# Patient Record
Sex: FEMALE | Race: WHITE | URBAN - METROPOLITAN AREA
[De-identification: names, ages, dates, MRNs, and addresses within clinical notes are randomized per-mention and may not be internally consistent; named-entity substitution may affect disease eponyms.]

---

## 2018-10-13 ENCOUNTER — OFFICE VISIT (OUTPATIENT)
Dept: URGENT CARE | Facility: URGENT CARE | Age: 48
End: 2018-10-13
Payer: COMMERCIAL

## 2018-10-13 VITALS
HEART RATE: 74 BPM | DIASTOLIC BLOOD PRESSURE: 92 MMHG | WEIGHT: 144 LBS | SYSTOLIC BLOOD PRESSURE: 157 MMHG | TEMPERATURE: 98.6 F | OXYGEN SATURATION: 99 %

## 2018-10-13 DIAGNOSIS — S60.454A FOREIGN BODY OF RIGHT RING FINGER: Primary | ICD-10-CM

## 2018-10-13 PROCEDURE — 99203 OFFICE O/P NEW LOW 30 MIN: CPT | Performed by: NURSE PRACTITIONER

## 2018-10-13 ASSESSMENT — ENCOUNTER SYMPTOMS
NEUROLOGICAL NEGATIVE: 1
EYES NEGATIVE: 1
GASTROINTESTINAL NEGATIVE: 1
PSYCHIATRIC NEGATIVE: 1
ALLERGIC/IMMUNOLOGIC NEGATIVE: 1
ENDOCRINE NEGATIVE: 1
CONSTITUTIONAL NEGATIVE: 1
CARDIOVASCULAR NEGATIVE: 1
MUSCULOSKELETAL NEGATIVE: 1
RESPIRATORY NEGATIVE: 1
HEMATOLOGIC/LYMPHATIC NEGATIVE: 1

## 2018-10-13 ASSESSMENT — PAIN SCALES - GENERAL: PAINLEVEL: NO PAIN (0)

## 2018-10-13 NOTE — NURSING NOTE
Chief Complaint   Patient presents with     Finger     C/o ring stuck on right ring finger.       Initial BP (!) 157/92  Pulse 74  Temp 98.6  F (37  C) (Oral)  Wt 144 lb (65.3 kg)  SpO2 99% There is no height or weight on file to calculate BMI..  BP completed using cuff size: haile Avitia CMA

## 2018-10-13 NOTE — MR AVS SNAPSHOT
"              After Visit Summary   10/13/2018    Margoth Ludwig    MRN: [id number]           Patient Information     Date Of Birth          1970        Visit Information        Provider Department      10/13/2018 12:35 PM Lin Leong APRN CNP [facility]        Today's Diagnoses     Foreign body of right ring finger    -  1       Follow-ups after your visit        Who to contact     If you have questions or need follow up information about today's clinic visit or your schedule please contact [facility] directly at [phone].  Normal or non-critical lab and imaging results will be communicated to you by [employer], letter or phone within 4 business days after the clinic has received the results. If you do not hear from us within 7 days, please contact the clinic through [employer] or phone. If you have a critical or abnormal lab result, we will notify you by phone as soon as possible.  Submit refill requests through Pet[employer] or call your pharmacy and they will forward the refill request to us. Please allow 3 business days for your refill to be completed.          Additional Information About Your Visit        MyChart Information     Petrabytes lets you send messages to your doctor, view your test results, renew your prescriptions, schedule appointments and more. To sign up, go to [email or web address] . Click on \"Log in\" on the left side of the screen, which will take you to the Welcome page. Then click on \"Sign up Now\" on the right side of the page.     You will be asked to enter the access code listed below, as well as some personal information. Please follow the directions to create your username and password.     Your access code is: [email or web address]  Expires: 2019  1:01 PM     Your access code will  in 90 days. If you need help or a new code, please call your [facility]3] or [phone].        Care EveryWhere ID     This is your Care EveryWhere ID. This could be " used by other organizations to access your [place] medical records  [id number]        Your Vitals Were     Pulse Temperature Pulse Oximetry             74 98.6  F (37  C) (Oral) 99%          Blood Pressure from Last 3 Encounters:   10/13/18 (!) 157/92   10/14/12 125/74    Weight from Last 3 Encounters:   10/13/18 144 lb (65.3 kg)              Today, you had the following     No orders found for display       Primary Care Provider Office Phone # Fax #    [facility] [phone] [address] [facility] [address]        Equal Access to Services     ALEXEY SALCIDO : Hadii srinivasan stiles hadasho Soserg, waaxda luqadaha, qaybta kaalmada adeegyada, mitchell gore . So [facility] [phone].    ATENCIÓN: Si habla español, tiene a maldonado disposición servicios gratuitos de asistencia lingüística. Llame al [phone].    We comply with applicable federal civil rights laws and [place] laws. We do not discriminate on the basis of race, color, national origin, age, disability, sex, sexual orientation, or gender identity.            Thank you!     Thank you for choosing [facility]  for your care. Our goal is always to provide you with excellent care. Hearing back from our patients is one way we can continue to improve our services. Please take a few minutes to complete the written survey that you may receive in the mail after your visit with us. Thank you!             Your Updated Medication List - Protect others around you: Learn how to safely use, store and throw away your medicines at www.disposemymeds.org.      Notice  As of 10/13/2018  1:01 PM    You have not been prescribed any medications.

## 2018-10-13 NOTE — PROGRESS NOTES
SUBJECTIVE:   Margoth Ludwig is a 48 year old female presenting with a chief complaint of   Chief Complaint   Patient presents with     Finger     C/o ring stuck on right ring finger.   Put on this morning and was too tight, got stuck is swollen  Has normal circulation  Some mild pain    She is a new patient of [place].      Review of Systems   Constitutional: Negative.    HENT: Negative.    Eyes: Negative.    Respiratory: Negative.    Cardiovascular: Negative.    Gastrointestinal: Negative.    Endocrine: Negative.    Genitourinary: Negative.    Musculoskeletal: Negative.    Skin: Negative.    Allergic/Immunologic: Negative.    Neurological: Negative.    Hematological: Negative.    Psychiatric/Behavioral: Negative.        History reviewed. No pertinent past medical history.  History reviewed. No pertinent family history.  No current outpatient prescriptions on file.     Social History   Substance Use Topics     Smoking status: Never Smoker     Smokeless tobacco: Never Used     Alcohol use Not on file       OBJECTIVE  BP (!) 157/92  Pulse 74  Temp 98.6  F (37  C) (Oral)  Wt 144 lb (65.3 kg)  SpO2 99%    Physical Exam   Constitutional: She is oriented to person, place, and time. She appears well-developed and well-nourished.   Cardiovascular: Normal rate and regular rhythm.    Pulmonary/Chest: Effort normal and breath sounds normal.   Neurological: She is alert and oriented to person, place, and time.   Skin: Skin is warm and dry.   Right hand ring finger, ring stuck, some edema and redness surrounding. Normal circulation   Nursing note and vitals reviewed.      ASSESSMENT:    (U18.000S) Foreign body of right ring finger  (primary encounter diagnosis)      PLAN:  Removed with ring cutter  Normal circulation, advised ice for home care      Followup:    If not improving or if condition worsens, follow up with your Primary Care Provider  KIRSTY Claire CNP  .chris

## 2025-06-29 ENCOUNTER — TELEPHONE (OUTPATIENT)
Dept: BEHAVIORAL HEALTH | Facility: CLINIC | Age: 55
End: 2025-06-29

## 2025-06-29 ENCOUNTER — HOSPITAL ENCOUNTER (INPATIENT)
Facility: CLINIC | Age: 55
End: 2025-06-29
Attending: PSYCHIATRY & NEUROLOGY | Admitting: PSYCHIATRY & NEUROLOGY
Payer: COMMERCIAL

## 2025-06-29 ENCOUNTER — HOSPITAL ENCOUNTER (EMERGENCY)
Facility: CLINIC | Age: 55
Discharge: ANOTHER HOSPITAL | End: 2025-06-29
Attending: EMERGENCY MEDICINE | Admitting: EMERGENCY MEDICINE
Payer: COMMERCIAL

## 2025-06-29 VITALS
WEIGHT: 135 LBS | BODY MASS INDEX: 23.05 KG/M2 | OXYGEN SATURATION: 97 % | RESPIRATION RATE: 18 BRPM | SYSTOLIC BLOOD PRESSURE: 152 MMHG | DIASTOLIC BLOOD PRESSURE: 78 MMHG | HEIGHT: 64 IN | TEMPERATURE: 98.7 F | HEART RATE: 77 BPM

## 2025-06-29 DIAGNOSIS — F30.10 MANIC BEHAVIOR (H): ICD-10-CM

## 2025-06-29 DIAGNOSIS — F20.9 SCHIZOPHRENIA, UNSPECIFIED TYPE (H): Primary | ICD-10-CM

## 2025-06-29 DIAGNOSIS — F41.9 ANXIETY: ICD-10-CM

## 2025-06-29 DIAGNOSIS — G47.09 OTHER INSOMNIA: ICD-10-CM

## 2025-06-29 PROBLEM — F29 PSYCHOSIS (H): Status: ACTIVE | Noted: 2025-06-29

## 2025-06-29 LAB
ALBUMIN UR-MCNC: NEGATIVE MG/DL
AMPHETAMINES UR QL SCN: NORMAL
ANION GAP SERPL CALCULATED.3IONS-SCNC: 12 MMOL/L (ref 7–15)
APPEARANCE UR: ABNORMAL
BARBITURATES UR QL SCN: NORMAL
BENZODIAZ UR QL SCN: NORMAL
BILIRUB UR QL STRIP: NEGATIVE
BUN SERPL-MCNC: 10.3 MG/DL (ref 6–20)
BZE UR QL SCN: NORMAL
CALCIUM SERPL-MCNC: 9.2 MG/DL (ref 8.8–10.4)
CANNABINOIDS UR QL SCN: NORMAL
CHLORIDE SERPL-SCNC: 104 MMOL/L (ref 98–107)
COLOR UR AUTO: ABNORMAL
CREAT SERPL-MCNC: 0.75 MG/DL (ref 0.51–0.95)
EGFRCR SERPLBLD CKD-EPI 2021: >90 ML/MIN/1.73M2
ERYTHROCYTE [DISTWIDTH] IN BLOOD BY AUTOMATED COUNT: 12.5 % (ref 10–15)
ETHANOL SERPL-MCNC: <0.01 G/DL
FENTANYL UR QL: NORMAL
FLUAV RNA SPEC QL NAA+PROBE: NEGATIVE
FLUBV RNA RESP QL NAA+PROBE: NEGATIVE
GLUCOSE SERPL-MCNC: 114 MG/DL (ref 70–99)
GLUCOSE UR STRIP-MCNC: NEGATIVE MG/DL
HCO3 SERPL-SCNC: 24 MMOL/L (ref 22–29)
HCT VFR BLD AUTO: 38.3 % (ref 35–47)
HGB BLD-MCNC: 12.7 G/DL (ref 11.7–15.7)
HGB UR QL STRIP: NEGATIVE
HOLD SPECIMEN: NORMAL
KETONES UR STRIP-MCNC: ABNORMAL MG/DL
LEUKOCYTE ESTERASE UR QL STRIP: NEGATIVE
MCH RBC QN AUTO: 28.4 PG (ref 26.5–33)
MCHC RBC AUTO-ENTMCNC: 33.2 G/DL (ref 31.5–36.5)
MCV RBC AUTO: 86 FL (ref 78–100)
MUCOUS THREADS #/AREA URNS LPF: PRESENT /LPF
NITRATE UR QL: NEGATIVE
OPIATES UR QL SCN: NORMAL
PCP QUAL URINE (ROCHE): NORMAL
PH UR STRIP: 6.5 [PH] (ref 5–7)
PLATELET # BLD AUTO: 296 10E3/UL (ref 150–450)
POTASSIUM SERPL-SCNC: 3.8 MMOL/L (ref 3.4–5.3)
RBC # BLD AUTO: 4.47 10E6/UL (ref 3.8–5.2)
RBC URINE: 1 /HPF
RSV RNA SPEC NAA+PROBE: NEGATIVE
SARS-COV-2 RNA RESP QL NAA+PROBE: NEGATIVE
SODIUM SERPL-SCNC: 140 MMOL/L (ref 135–145)
SP GR UR STRIP: 1.02 (ref 1–1.03)
SQUAMOUS EPITHELIAL: <1 /HPF
UROBILINOGEN UR STRIP-MCNC: NORMAL MG/DL
WBC # BLD AUTO: 6.1 10E3/UL (ref 4–11)
WBC URINE: 2 /HPF

## 2025-06-29 PROCEDURE — 99285 EMERGENCY DEPT VISIT HI MDM: CPT

## 2025-06-29 PROCEDURE — 93010 ELECTROCARDIOGRAM REPORT: CPT | Mod: XP | Performed by: INTERNAL MEDICINE

## 2025-06-29 PROCEDURE — 80307 DRUG TEST PRSMV CHEM ANLYZR: CPT | Performed by: EMERGENCY MEDICINE

## 2025-06-29 PROCEDURE — 250N000013 HC RX MED GY IP 250 OP 250 PS 637

## 2025-06-29 PROCEDURE — 85014 HEMATOCRIT: CPT | Performed by: EMERGENCY MEDICINE

## 2025-06-29 PROCEDURE — 82077 ASSAY SPEC XCP UR&BREATH IA: CPT | Performed by: EMERGENCY MEDICINE

## 2025-06-29 PROCEDURE — 124N000002 HC R&B MH UMMC

## 2025-06-29 PROCEDURE — 87637 SARSCOV2&INF A&B&RSV AMP PRB: CPT | Performed by: EMERGENCY MEDICINE

## 2025-06-29 PROCEDURE — 85018 HEMOGLOBIN: CPT | Performed by: EMERGENCY MEDICINE

## 2025-06-29 PROCEDURE — 93010 ELECTROCARDIOGRAM REPORT: CPT | Performed by: EMERGENCY MEDICINE

## 2025-06-29 PROCEDURE — 80048 BASIC METABOLIC PNL TOTAL CA: CPT | Performed by: EMERGENCY MEDICINE

## 2025-06-29 PROCEDURE — 93005 ELECTROCARDIOGRAM TRACING: CPT

## 2025-06-29 PROCEDURE — 36415 COLL VENOUS BLD VENIPUNCTURE: CPT | Performed by: EMERGENCY MEDICINE

## 2025-06-29 PROCEDURE — 81001 URINALYSIS AUTO W/SCOPE: CPT | Performed by: EMERGENCY MEDICINE

## 2025-06-29 PROCEDURE — 99284 EMERGENCY DEPT VISIT MOD MDM: CPT | Performed by: EMERGENCY MEDICINE

## 2025-06-29 PROCEDURE — 82374 ASSAY BLOOD CARBON DIOXIDE: CPT | Performed by: EMERGENCY MEDICINE

## 2025-06-29 RX ORDER — AZITHROMYCIN 250 MG/1
250 TABLET, FILM COATED ORAL DAILY
Status: COMPLETED | OUTPATIENT
Start: 2025-06-29 | End: 2025-07-01

## 2025-06-29 RX ORDER — AMOXICILLIN 250 MG
1 CAPSULE ORAL 2 TIMES DAILY PRN
Status: DISPENSED | OUTPATIENT
Start: 2025-06-29

## 2025-06-29 RX ORDER — ALBUTEROL SULFATE 90 UG/1
1-2 INHALANT RESPIRATORY (INHALATION) EVERY 4 HOURS PRN
Status: ON HOLD | COMMUNITY
Start: 2025-06-19 | End: 2025-06-29

## 2025-06-29 RX ORDER — OLANZAPINE 10 MG/2ML
10 INJECTION, POWDER, FOR SOLUTION INTRAMUSCULAR 3 TIMES DAILY PRN
Status: ACTIVE | OUTPATIENT
Start: 2025-06-29

## 2025-06-29 RX ORDER — OLANZAPINE 10 MG/1
10 TABLET, FILM COATED ORAL 3 TIMES DAILY PRN
Status: ACTIVE | OUTPATIENT
Start: 2025-06-29

## 2025-06-29 RX ORDER — MAGNESIUM HYDROXIDE/ALUMINUM HYDROXICE/SIMETHICONE 120; 1200; 1200 MG/30ML; MG/30ML; MG/30ML
30 SUSPENSION ORAL EVERY 4 HOURS PRN
Status: ACTIVE | OUTPATIENT
Start: 2025-06-29

## 2025-06-29 RX ORDER — TRAZODONE HYDROCHLORIDE 50 MG/1
50 TABLET ORAL
Status: DISCONTINUED | OUTPATIENT
Start: 2025-06-29 | End: 2025-07-03

## 2025-06-29 RX ORDER — HYDROXYZINE HYDROCHLORIDE 25 MG/1
25 TABLET, FILM COATED ORAL EVERY 4 HOURS PRN
Status: DISPENSED | OUTPATIENT
Start: 2025-06-29

## 2025-06-29 RX ORDER — AZITHROMYCIN 250 MG/1
TABLET, FILM COATED ORAL
Status: ON HOLD | COMMUNITY
Start: 2025-06-27 | End: 2025-07-02

## 2025-06-29 RX ORDER — BENZONATATE 100 MG/1
100-200 CAPSULE ORAL 3 TIMES DAILY PRN
Status: ON HOLD | COMMUNITY
End: 2025-06-29

## 2025-06-29 RX ORDER — ACETAMINOPHEN 325 MG/1
650 TABLET ORAL EVERY 4 HOURS PRN
Status: DISPENSED | OUTPATIENT
Start: 2025-06-29

## 2025-06-29 RX ADMIN — HYDROXYZINE HYDROCHLORIDE 25 MG: 25 TABLET, FILM COATED ORAL at 21:50

## 2025-06-29 RX ADMIN — TRAZODONE HYDROCHLORIDE 50 MG: 50 TABLET ORAL at 20:20

## 2025-06-29 RX ADMIN — Medication 3 MG: at 21:50

## 2025-06-29 RX ADMIN — AZITHROMYCIN DIHYDRATE 250 MG: 250 TABLET ORAL at 17:32

## 2025-06-29 ASSESSMENT — ACTIVITIES OF DAILY LIVING (ADL)
TOILETING_ISSUES: NO
DIFFICULTY_EATING/SWALLOWING: NO
ADLS_ACUITY_SCORE: 15
ADLS_ACUITY_SCORE: 41
ADLS_ACUITY_SCORE: 15
LAUNDRY: WITH SUPERVISION
ADLS_ACUITY_SCORE: 41
ADLS_ACUITY_SCORE: 15
FALL_HISTORY_WITHIN_LAST_SIX_MONTHS: NO
CONCENTRATING,_REMEMBERING_OR_MAKING_DECISIONS_DIFFICULTY: NO
ADLS_ACUITY_SCORE: 41
ADLS_ACUITY_SCORE: 41
ORAL_HYGIENE: INDEPENDENT
ADLS_ACUITY_SCORE: 15
DRESSING/BATHING_DIFFICULTY: NO
ADLS_ACUITY_SCORE: 15
ADLS_ACUITY_SCORE: 41
DOING_ERRANDS_INDEPENDENTLY_DIFFICULTY: NO
ADLS_ACUITY_SCORE: 41
ADLS_ACUITY_SCORE: 41
WALKING_OR_CLIMBING_STAIRS_DIFFICULTY: NO
DRESS: INDEPENDENT
ADLS_ACUITY_SCORE: 41
DIFFICULTY_COMMUNICATING: NO
HYGIENE/GROOMING: INDEPENDENT
HEARING_DIFFICULTY_OR_DEAF: NO
ADLS_ACUITY_SCORE: 15
CHANGE_IN_FUNCTIONAL_STATUS_SINCE_ONSET_OF_CURRENT_ILLNESS/INJURY: NO
WEAR_GLASSES_OR_BLIND: NO

## 2025-06-29 ASSESSMENT — COLUMBIA-SUICIDE SEVERITY RATING SCALE - C-SSRS
6. HAVE YOU EVER DONE ANYTHING, STARTED TO DO ANYTHING, OR PREPARED TO DO ANYTHING TO END YOUR LIFE?: NO
2. HAVE YOU ACTUALLY HAD ANY THOUGHTS OF KILLING YOURSELF IN THE PAST MONTH?: NO
1. IN THE PAST MONTH, HAVE YOU WISHED YOU WERE DEAD OR WISHED YOU COULD GO TO SLEEP AND NOT WAKE UP?: NO
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
## 2025-06-29 NOTE — ED TRIAGE NOTES
[place]    [place]    Wants [place] police    Voices through the computer    Breakaway from the [facility].      has phone.
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
## 2025-06-29 NOTE — PHARMACY-ADMISSION MEDICATION HISTORY
Admission Medication History Completed by Pharmacy    See [facility] Admission Navigator for allergy information, preferred outpatient pharmacy, prior to admission medications and immunization status.     Medication history sources:  Patient; Surescripts     Pertinent changes made to PTA medication list:  Added: N/A  Deleted:   - Albuterol inhaler (pt reports no longer taking)   Changed: N/A    Additional medication history information:   - Patient is unsure if she was 1 or 2 days left of her azithromycin therapy.   - Patient denies taking any additional Rx/OTC medications other than the ones listed below.    Prior to Admission medications    Medication Sig Last Dose Taking? Auth Provider Long Term End Date   azithromycin (ZITHROMAX) 250 MG tablet Take two tablets now, then one tablet daily for 4 days.  Indications: Infection 6/28/2025 Yes Reported, Patient  7/2/25          Date completed: 06/29/25    Medication history completed by:   Imelda Polo, PharmD  *[id number]
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
## 2025-06-29 NOTE — PLAN OF CARE
Goal Outcome Evaluation:            06/29/25 1519   Patient Belongings   Did you bring any home meds/supplements to the hospital?  No   Patient Belongings locker;remains with patient   Patient Belongings Remaining with Patient jewelry   Patient Belongings Put in Hospital Secure Location (Security or Locker, etc.) clothing;cell phone/electronics   Belongings Search Yes   Clothing Search Yes   Second Staff Karly HILL         In Locker: cellphone, bra, underwear, pants, shirt, shoes    Nothing sent to security.     Addendum 07/03/25: 2 underwear, 2 pants, 2 t-shirts, jacket      A               Admission:  I am responsible for any personal items that are not sent to the safe or pharmacy.  [facility] is not responsible for loss, theft or damage of any property in my possession.    Signature:  _________________________________ Date: _______  Time: _____                                              Staff Signature:  ____________________________ Date: ________  Time: _____      2nd Staff person, if patient is unable/unwilling to sign:    Signature: ________________________________ Date: ________  Time: _____     Discharge:  [facility] has returned all of my personal belongings:    Signature: _________________________________ Date: ________  Time: _____                                          Staff Signature:  ____________________________ Date: ________  Time: _____

## 2025-06-29 NOTE — ED NOTES
"Pt called out, desiring to leave to \" do a wellness check on my \" MD updated, will go see patient now.   " None

## 2025-06-29 NOTE — ED NOTES
IP MH Referral Acuity Rating Score (RARS)    LMHP complete at referral to IP MH, with DEC; and, daily while awaiting IP MH placement. Call score to PPS.  CRITERIA SCORING   New 71 HH and Involuntary for IP MH (not adolescent) 0/3   Boarding over 24 hours 0/1   Vulnerable adult at least 55+ with multiple co morbidities; or, Patient age 11 or under 0/1   Suicide ideation without relief of precipitating factors 0/1   Current plan for suicide 0/1   Current plan for homicide 0/1   Imminent risk or actual attempt to seriously harm another without relief of factors precipitating the attempt 1/1   Severe dysfunction in daily living (ex: complete neglect for self care, extreme disruption in vegetative function, extreme deterioration in social interactions) 1/1   Recent (last 2 weeks) or current physical aggression in the ED 0/1   Restraints or seclusion episode in ED 0/1   Verbal aggression, agitation, yelling, etc., while in the ED 0/1   Active psychosis with psychomotor agitation or catatonia 1/1   Need for constant or near constant redirection (from leaving, from others, etc).  1/1   Intrusive or disruptive behaviors 1/1   TOTAL 5

## 2025-06-30 LAB
CHOLEST SERPL-MCNC: 102 MG/DL
EST. AVERAGE GLUCOSE BLD GHB EST-MCNC: 117 MG/DL
FOLATE SERPL-MCNC: 6.6 NG/ML (ref 4.6–34.8)
HBA1C MFR BLD: 5.7 %
HDLC SERPL-MCNC: 40 MG/DL
LDLC SERPL CALC-MCNC: 40 MG/DL
NONHDLC SERPL-MCNC: 62 MG/DL
TRIGL SERPL-MCNC: 109 MG/DL
TSH SERPL DL<=0.005 MIU/L-ACNC: 3.46 UIU/ML (ref 0.3–4.2)
VIT B12 SERPL-MCNC: 435 PG/ML (ref 232–1245)
VIT D+METAB SERPL-MCNC: 32 NG/ML (ref 20–50)

## 2025-06-30 PROCEDURE — 97150 GROUP THERAPEUTIC PROCEDURES: CPT | Mod: GO

## 2025-06-30 PROCEDURE — 99232 SBSQ HOSP IP/OBS MODERATE 35: CPT | Mod: GC | Performed by: PSYCHIATRY & NEUROLOGY

## 2025-06-30 PROCEDURE — 82746 ASSAY OF FOLIC ACID SERUM: CPT

## 2025-06-30 PROCEDURE — 124N000002 HC R&B MH UMMC

## 2025-06-30 PROCEDURE — H2032 ACTIVITY THERAPY, PER 15 MIN: HCPCS

## 2025-06-30 PROCEDURE — 36415 COLL VENOUS BLD VENIPUNCTURE: CPT

## 2025-06-30 PROCEDURE — 84443 ASSAY THYROID STIM HORMONE: CPT

## 2025-06-30 PROCEDURE — 83036 HEMOGLOBIN GLYCOSYLATED A1C: CPT

## 2025-06-30 PROCEDURE — 82306 VITAMIN D 25 HYDROXY: CPT

## 2025-06-30 PROCEDURE — 250N000013 HC RX MED GY IP 250 OP 250 PS 637

## 2025-06-30 PROCEDURE — 80061 LIPID PANEL: CPT

## 2025-06-30 PROCEDURE — 82607 VITAMIN B-12: CPT

## 2025-06-30 RX ADMIN — AZITHROMYCIN DIHYDRATE 250 MG: 250 TABLET ORAL at 08:24

## 2025-06-30 RX ADMIN — ACETAMINOPHEN 650 MG: 325 TABLET ORAL at 08:54

## 2025-06-30 RX ADMIN — HYDROXYZINE HYDROCHLORIDE 25 MG: 25 TABLET, FILM COATED ORAL at 21:32

## 2025-06-30 ASSESSMENT — ACTIVITIES OF DAILY LIVING (ADL)
ADLS_ACUITY_SCORE: 20
ADLS_ACUITY_SCORE: 15
ADLS_ACUITY_SCORE: 20
ADLS_ACUITY_SCORE: 15
ADLS_ACUITY_SCORE: 20
LAUNDRY: WITH SUPERVISION
HYGIENE/GROOMING: INDEPENDENT
ADLS_ACUITY_SCORE: 20
ORAL_HYGIENE: INDEPENDENT
DRESS: INDEPENDENT
ADLS_ACUITY_SCORE: 20
ADLS_ACUITY_SCORE: 20
ADLS_ACUITY_SCORE: 15
ADLS_ACUITY_SCORE: 20
HYGIENE/GROOMING: INDEPENDENT
ADLS_ACUITY_SCORE: 20
ADLS_ACUITY_SCORE: 20
ADLS_ACUITY_SCORE: 15
ADLS_ACUITY_SCORE: 20
DRESS: INDEPENDENT
ADLS_ACUITY_SCORE: 15
ADLS_ACUITY_SCORE: 20
ORAL_HYGIENE: INDEPENDENT
ADLS_ACUITY_SCORE: 20

## 2025-06-30 NOTE — PLAN OF CARE
Problem: Adult Behavioral Health Plan of Care  Goal: Absence of New-Onset Illness or Injury  Outcome: Progressing     Problem: Anxiety Signs/Symptoms  Goal: Improved Sleep (Anxiety Signs/Symptoms)  Outcome: Progressing   Goal Outcome Evaluation:       Pt.appeared asleep and comfortable through the night. Slept for 6 hours. Breath sounds were clear and unlabored. No prn medication administered overnight. No safety or behavioral concerns observed or reported. Will continue to monitor.

## 2025-06-30 NOTE — PLAN OF CARE
Occupational Therapy   Brief Assessment   Station 22    Information is based on chart review, interdisciplinary team correspondence and writer's observations and interactions with the patient in groups and on the unit.      Data     06/30/25 1500   General Information   Date Initially Attended OT 06/30/25   Clinical Impression   Affect Restricted;Appropriate to situation   Orientation Oriented to person, place and time   Appearance and ADLs General cleanliness observed in most areas   Attention to Internal Stimuli No observed signs;Needs further assessment  (reports AH but not observed responding in groups thus far)   Interaction Skills Interacts appropriately with staff;Interacts appropriately with peers   Ability to Communicate Needs Independent   Verbal Content Articulate;Clear;Appropriate to topic   Ability to Maintain Boundaries Maintains appropriate physical boundaries;Maintains appropriate verbal boundaries   Participation Independently participates   Concentration Concentrates 30+ minutes   Ability to Concentrate Without difficulty   Follows and Comprehends Directions Independently follows multi-step directions   Memory Delayed and immediate recall intact   Organization Independently organizes all tasks   Decision Making Independent   Planning and Problem Solving Occasionally needs assist/feedback   Ability to Apply and Learn Concepts Needs further assessment   Frustrations / Stress Tolerance Needs further assessment   Level of Insight Identifies needs with structure/support   Self Esteem Takes risks with support and encouragement;Accepts positive feedback   Social Supports Has knowledge of support systems   BEH OT Care Plan Goals   OT Care Plan coping with symptoms       Assessment    Anticipate patient will benefit from a safe, structured environment focusing on recovery strategies while stabilizing on medication.  Patient appears to have limited knowledge of and follow through with coping skills and  self-management strategies, thus contributing to exacerbation of mental health symptoms.  Patient's insight into their mental illness is fair at this time.  Patient would benefit from OT groups for exploration of coping skills and leisure interests for symptom and stress management, exploration of and practice using relapse prevention strategies, and opportunities for: Self-care skills, social interaction skills, self-management skills and ADL/work/leisure/processing skills training.      Plan    Care plan goals have been initiated (see flowsheet above).  Plan to offer graded occupation-based activities to support progress towards goals and independence with ADLs/IADLs in the community upon discharge.  Continue to correspond with interdisciplinary team regarding ongoing treatment plan, potential changes in patient's status, and discharge planning.     Pat Galan, OTR, OTR/L  6/30/2025

## 2025-06-30 NOTE — PLAN OF CARE
"  Rehab Group    Start time: 1320  End time: 1405  Patient time total: 45 minutes    attended full group    #5 attended   Group Type: occupational therapy   Group Topic Covered: balanced lifestyle, Physical activity, and social skills     Group Session Detail:  Dice game - discussion questions & gentle movement     Patient Response/Contribution:  cooperative with task, listened actively, attentive, and actively engaged     Patient Detail:    Pt actively participated in a structured occupational therapy group with a focus on social engagement and movement. Pt had the option to choose a self-reflection question or a gentle exercise/stretching movement, and pt primarily chose to answer questions. Pt appeared comfortable responding to discussion prompts, and also followed along with all guided movements. When prompted to identify a new hobby she would be interested in trying, she identified \"yoga.\" When prompted to identify a goal she has for the future, she discussed wanting to become more \"tech savvy.\" Calm, pleasant, and engaged.      [id number] OT Group (2 or more in attendance)      Patient Active Problem List   Diagnosis    Psychosis (H)       "
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
## 2025-06-30 NOTE — PLAN OF CARE
06/30/25 1123   Individualization/Patient Specific Goals   Patient Personal Strengths family/social support;independent living skills   Patient Vulnerabilities family/relationship conflict;lacks insight into illness   Interprofessional Rounds   Participants CTC;nursing;psychiatrist   Behavioral Team Discussion   Progress New admit   Anticipated length of stay 10-15 days   Continued Stay Criteria/Rationale Ongoing symptoms, medication management   Medical/Physical See H&P   Precautions See below   Plan Psychiatric assessment/Medication management. Therapeutic Milieu. Individual care planning and after care planning. Patient to participate in unit groups and activities. Individual and group support on unit.   Rationale for change in precautions or plan N/A   Safety Plan Per unit protocol         PRECAUTIONS AND SAFETY    Behavioral Orders   Procedures    Code 1 - Restrict to Unit    Routine Programming     As clinically indicated    Status 15     Every 15 minutes.       Safety  Safety WDL: WDL  Patient Location: lounge  Observed Behavior: calm, sitting  Observed Behavior (Comment): calm  Safety Measures: safety rounds completed  Diversional Activity: music, television  Suicidality: Status 15

## 2025-06-30 NOTE — PLAN OF CARE
Rehab Group    Start time: 1030  End time: 1210  Patient time total: 100 minutes    attended full group    #5 attended   Group Type: occupational therapy   Group Topic Covered: coping skills     Group Session Detail:  OT clinic     Patient Response/Contribution:  cooperative with task, attentive, and actively engaged     Patient Detail:    Pt actively participated in occupational therapy clinic to facilitate coping skill exploration, creative expression within personally meaningful activities, and clinical observation of social, cognitive, and kinesthetic performance skills. Pt response: Upon arrival to group, pt was oriented to project options and group materials. Task materials were provided, and subsequently independent to initiate, sequence, and adjust to workspace demands as needed. Demonstrated fair focus, planning, and attention to detail for selected creative expression task. Upon completing her project, she was receptive to learning task steps for a novel creative expression task. Able to ask for assistance as needed, and occasionally socialized with peers and staff. Affect appeared to brighten briefly in interactions.       [id number] OT Group (2 or more in attendance)      Patient Active Problem List   Diagnosis    Psychosis (H)
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
## 2025-06-30 NOTE — PLAN OF CARE
BEH IP Unit Acuity Rating Score (UARS)  Patient is given one point for every criteria they meet.    CRITERIA SCORING   On a 72 hour hold, court hold, committed, stay of commitment, or revocation. 0    Patient LOS on BEH unit exceeds 20 days. 0  LOS: 1   Patient under guardianship, 55+, otherwise medically complex, or under age 11. 1   Suicide ideation without relief of precipitating factors. 0   Current plan for suicide. 0   Current plan for homicide. 0   Imminent risk or actual attempt to seriously harm another without relief of factors precipitating the attempt. 1   Severe dysfunction in daily living (ex: complete neglect for self care, extreme disruption in vegetative function, extreme deterioration in social interactions). 1   Recent (last 7 days) or current physical aggression in the ED or on unit. 0   Restraints or seclusion episode in past 72 hours. 0   Recent (last 7 days) or current verbal aggression, agitation, yelling, etc., while in the ED or unit. 0   Active psychosis. 1   Need for constant or near constant redirection (from leaving, from others, etc).  1   Intrusive or disruptive behaviors. 1   Patient requires 3 or more hours of individualized nursing care per 8-hour shift (i.e. for ADLs, meds, therapeutic interventions). 0   TOTAL 5

## 2025-07-01 LAB
ATRIAL RATE - MUSE: 90 BPM
DIASTOLIC BLOOD PRESSURE - MUSE: NORMAL MMHG
INTERPRETATION ECG - MUSE: NORMAL
P AXIS - MUSE: 69 DEGREES
PR INTERVAL - MUSE: 144 MS
QRS DURATION - MUSE: 88 MS
QT - MUSE: 354 MS
QTC - MUSE: 433 MS
R AXIS - MUSE: -39 DEGREES
SYSTOLIC BLOOD PRESSURE - MUSE: NORMAL MMHG
T AXIS - MUSE: 53 DEGREES
VENTRICULAR RATE- MUSE: 90 BPM

## 2025-07-01 PROCEDURE — 97150 GROUP THERAPEUTIC PROCEDURES: CPT | Mod: GO

## 2025-07-01 PROCEDURE — 124N000002 HC R&B MH UMMC

## 2025-07-01 PROCEDURE — 99232 SBSQ HOSP IP/OBS MODERATE 35: CPT | Mod: GC | Performed by: PSYCHIATRY & NEUROLOGY

## 2025-07-01 PROCEDURE — 250N000013 HC RX MED GY IP 250 OP 250 PS 637

## 2025-07-01 RX ORDER — ARIPIPRAZOLE 5 MG/1
5 TABLET ORAL DAILY
Status: DISCONTINUED | OUTPATIENT
Start: 2025-07-01 | End: 2025-07-03

## 2025-07-01 RX ADMIN — ARIPIPRAZOLE 5 MG: 5 TABLET ORAL at 11:17

## 2025-07-01 RX ADMIN — AZITHROMYCIN DIHYDRATE 250 MG: 250 TABLET ORAL at 08:14

## 2025-07-01 RX ADMIN — Medication 3 MG: at 23:45

## 2025-07-01 ASSESSMENT — ACTIVITIES OF DAILY LIVING (ADL)
LAUNDRY: WITH SUPERVISION
ADLS_ACUITY_SCORE: 20
LAUNDRY: WITH SUPERVISION
ADLS_ACUITY_SCORE: 20
DRESS: INDEPENDENT
ADLS_ACUITY_SCORE: 20
ADLS_ACUITY_SCORE: 20
HYGIENE/GROOMING: INDEPENDENT
ADLS_ACUITY_SCORE: 20
DRESS: INDEPENDENT
ADLS_ACUITY_SCORE: 20
ORAL_HYGIENE: INDEPENDENT
ADLS_ACUITY_SCORE: 20
ORAL_HYGIENE: INDEPENDENT
ADLS_ACUITY_SCORE: 20
HYGIENE/GROOMING: INDEPENDENT
ADLS_ACUITY_SCORE: 20

## 2025-07-01 NOTE — PLAN OF CARE
Problem: Sleep Disturbance  Goal: Adequate Sleep/Rest  Outcome: Progressing  Intervention: Promote Sleep/Rest  Flowsheets (Taken 7/1/2025 0037)  Sleep/Rest Enhancement:   awakenings minimized   room darkened   noise level reduced     Goal Outcome Evaluation:    Patient in bed at the start of the shift and  appeared to be comfortably asleep and breathing with ease throughout the shift. Patient slept for 6hours of the over night shift with no s/s of acute distress.    Patient experienced no safety events or escalations during the shift, no concerns reported or observed. Safety checks completed at least every 15 minutes. Patient required no PRN medications, see MAR.    Nursing will continue to monitor.

## 2025-07-01 NOTE — PLAN OF CARE
BEH IP Unit Acuity Rating Score (UARS)  Patient is given one point for every criteria they meet.    CRITERIA SCORING   On a 72 hour hold, court hold, committed, stay of commitment, or revocation. 0    Patient LOS on BEH unit exceeds 20 days. 0  LOS: 2   Patient under guardianship, 55+, otherwise medically complex, or under age 11. 1   Suicide ideation without relief of precipitating factors. 0   Current plan for suicide. 0   Current plan for homicide. 0   Imminent risk or actual attempt to seriously harm another without relief of factors precipitating the attempt. 1   Severe dysfunction in daily living (ex: complete neglect for self care, extreme disruption in vegetative function, extreme deterioration in social interactions). 1   Recent (last 7 days) or current physical aggression in the ED or on unit. 0   Restraints or seclusion episode in past 72 hours. 0   Recent (last 7 days) or current verbal aggression, agitation, yelling, etc., while in the ED or unit. 0   Active psychosis. 1   Need for constant or near constant redirection (from leaving, from others, etc).  1   Intrusive or disruptive behaviors. 1   Patient requires 3 or more hours of individualized nursing care per 8-hour shift (i.e. for ADLs, meds, therapeutic interventions). 0   TOTAL 5

## 2025-07-01 NOTE — PLAN OF CARE
Goal Outcome Evaluation:    Plan of Care Reviewed With: patient       Patient spent time sitting in room or sitting in lounge, minimal interaction with peers.  Pt took shower and was polite, but appeared hesitant when asked what items she would need.  Pt also indecisive when asked if she might need any prns, reporting that she took melatonin and Trazodone last night to minimal effect.  Pt also reported being anxious, settling on Vistaril.

## 2025-07-01 NOTE — PLAN OF CARE
Rehab Group    Start time: 1025  End time: 1200  Patient time total: 85 minutes    attended full group    #4 attended   Group Type: occupational therapy   Group Topic Covered: coping skills     Group Session Detail:  OT clinic     Patient Response/Contribution:  cooperative with task, attentive, and actively engaged     Patient Detail:    Pt actively participated in occupational therapy clinic to facilitate coping skill exploration, creative expression within personally meaningful activities, and clinical observation of social, cognitive, and kinesthetic performance skills. Pt response: Independent to initiate, gather materials, sequence, and adjust to workspace demands as needed. Demonstrated good focus, planning, and attention to detail for selected creative expression task. Able to ask for assistance as needed, and appeared comfortable interacting with peers and staff when conversation was initiated with her, though spent a majority of this group quietly focused on her task. Calm and cooperative. Affect appeared restricted.      [id number] OT Group (2 or more in attendance)      Patient Active Problem List   Diagnosis    Psychosis (H)

## 2025-07-01 NOTE — PLAN OF CARE
"Problem: Adult Behavioral Health Plan of Care  Goal: Plan of Care Review  Outcome: Progressing  Flowsheets (Taken 7/1/2025 1106)  Plan of Care Reviewed With: patient  Overall Patient Progress: improving  Patient Agreement with Plan of Care: agrees  Goal: Optimized Coping Skills in Response to Life Stressors  Outcome: Progressing  Flowsheets (Taken 7/1/2025 1106)  Optimized Coping Skills in Response to Life Stressors: making progress toward outcome     Problem: Psychotic Signs/Symptoms  Goal: Improved Mood Symptoms (Psychotic Signs/Symptoms)  Outcome: Progressing  Flowsheets (Taken 7/1/2025 1106)  Mutually Determined Action Steps (Improved Mood Symptoms): acknowledges progress  Goal: Decreased Sensory Symptoms (Psychotic Signs/Symptoms)  Outcome: Progressing  Flowsheets (Taken 7/1/2025 1106)  Mutually Determined Action Steps (Decreased Sensory Symptoms): adheres to medication regimen   Goal Outcome Evaluation:      Plan of Care Reviewed With: patient    Overall Patient Progress: improvingOverall Patient Progress: improving     Margoth COVARRUBIAS sitting in lounge at beginning of shift-eyes downcast- appeared anxious-returned to room when bk arrived- on approach stated she is feeling very sad because she is all alone in the world-complained family members were not answering their phones this morning and that she felt all alone-complained sister said she would visit yesterday, but didn't, then mentioned that it might be related to Margoth writing on Facebook that she never wanted to see her again because she's too nosey-went on to say although she wrote that she didn't really mean she never wanted to talk to her, \"I mean she is fito nosey, but she's my sister. Of course I want to see my sister.\"- accepted newly prescribed Abilify, initially hesitant, examining labeling on package and making statements that it is not same medication discussed with MD, but post brief review and reassurance, shrugged shoulders and took medication- " 1430 Margoth attended grps today- denies any negative side effects from Abilify-appears more relaxed

## 2025-07-01 NOTE — PLAN OF CARE
"  Rehab Group    Start time: 1320  End time: 1405  Patient time total: 45 minutes    attended full group     #3 attended   Group Type: occupational therapy   Group Topic Covered: balanced lifestyle, coping skills, and relaxation      Group Session Detail:  Chair yoga     Patient Response/Contribution:  cooperative with task, listened actively, attentive, and actively engaged     Patient Detail:    Pt actively participated in a structured occupational therapy group with a focus on coping through movement to facilitate relaxation and stress management via chair yoga. Reported that yoga was new to her. Pt followed and engaged in approximately 90% of the yoga poses, and verbalized feeling \"good\" at the end of group. Appeared calm and attentive throughout.      [id number] OT Group (2 or more in attendance)      Patient Active Problem List   Diagnosis    Psychosis (H)       "

## 2025-07-01 NOTE — PROGRESS NOTES
Rehab Group    Start time: 1715  End time: 1815  Patient time total: 45 minutes    attended full group    #4 attended   Group Type: art   Group Topic Covered: activity therapy       Group Session Detail:  Art Therapy directive was to create imagery expressing both positive things in pts life and things they would like to change/let go using hand tracings and imagery and writing.  Goals of directive: emotional expression, emotional regulation, assessing pts motivation for change, media exploration     Patient Response/Contribution:  cooperative with task       Patient Detail:    Pt was an engaged participant, focused on task for the full duration of group. Pt finished artwork and briefly verbally processed with author and group. Pts mood was calm, flat/blunted affect.      Activity Therapy Per 15 min ()      Patient Active Problem List   Diagnosis    Psychosis (H)

## 2025-07-01 NOTE — PROGRESS NOTES
"  ----------------------------------------------------------------------------------------------------------  [facility]  Psychiatry Progress Note  Hospital Day #2     Interim History:     The patient's care was discussed with the treatment team and chart notes were reviewed.    Vitals: VSS  Sleep: 6 hours (07/01/25 0629)  Scheduled medications: Took all scheduled medications as prescribed  Psychiatric PRN medications:   Last 24H PRN:     hydrOXYzine HCl (ATARAX) tablet 25 mg, 25 mg at 06/30/25 4902   Staff Report:   No acute events or safety concerns overnight. Please see staff notes for details.      Subjective:     Patient Interview:  Margoth Ludwig reports doing \"okay\". She slept \"okay\", better than previously but not good. She was unsure about what medications she's taking and had questions about this; she was wondering about possible side effects. Her  helped her with her medications previously. Discussed starting Abilify; she agreed with this plan after discussing benefits and risks. Groups are going well, and she feels comfortable. She talked to her  yesterday which went well; she is feeling safer with him. She is hoping to leave by the 3rd.      does not have a therapist and is interested in this. She lives in [place] and goes to [facility] in [place] for other medical appointments.     ROS:  Patient has no bothersome physical symptoms  Patient denies acute concerns     Objective:     Vitals:  BP (!) 160/91 (BP Location: Right arm, Patient Position: Sitting, Cuff Size: Adult Regular)   Pulse 80   Temp 98.4  F (36.9  C) (Temporal)   Resp 16   Ht 1.626 m (5' 4\")   Wt 60.6 kg (133 lb 11.2 oz)   SpO2 97%   BMI 22.95 kg/m      Allergies:  Allergies   Allergen Reactions    Clindamycin Hives    Erythromycin Hives    Penicillins Hives    Amoxicillin Hives       Current Medications:  Scheduled:  Current Facility-Administered Medications "   Medication Dose Route Frequency Provider Last Rate Last Admin    acetaminophen (TYLENOL) tablet 650 mg  650 mg Oral Q4H PRN Juan Thayer MD   650 mg at 06/30/25 0854    alum & mag hydroxide-simethicone (MAALOX) suspension 30 mL  30 mL Oral Q4H PRN Juan Thayer MD        ARIPiprazole (ABILIFY) tablet 5 mg  5 mg Oral Daily Juan Thayer MD   5 mg at 07/01/25 1117    hydrOXYzine HCl (ATARAX) tablet 25 mg  25 mg Oral Q4H PRN Juan Thayer MD   25 mg at 06/30/25 2132    melatonin tablet 3 mg  3 mg Oral At Bedtime PRN Juan Thayer MD   3 mg at 06/29/25 2150    OLANZapine (zyPREXA) tablet 10 mg  10 mg Oral TID PRN Juan Thayer MD        Or    OLANZapine (zyPREXA) injection 10 mg  10 mg Intramuscular TID PRN Juan Thayer MD        senna-docusate (SENOKOT-S/PERICOLACE) 8.6-50 MG per tablet 1 tablet  1 tablet Oral BID PRN Juan Thayer MD        traZODone (DESYREL) tablet 50 mg  50 mg Oral At Bedtime PRN Juan Thayer MD   50 mg at 06/29/25 2020       PRN:  Current Facility-Administered Medications   Medication Dose Route Frequency Provider Last Rate Last Admin    acetaminophen (TYLENOL) tablet 650 mg  650 mg Oral Q4H PRN Juan Thayer MD   650 mg at 06/30/25 0854    alum & mag hydroxide-simethicone (MAALOX) suspension 30 mL  30 mL Oral Q4H PRN Juan Thayer MD        ARIPiprazole (ABILIFY) tablet 5 mg  5 mg Oral Daily Juan Thayer MD   5 mg at 07/01/25 1117    hydrOXYzine HCl (ATARAX) tablet 25 mg  25 mg Oral Q4H PRN Juan Thayer MD   25 mg at 06/30/25 2132    melatonin tablet 3 mg  3 mg Oral At Bedtime PRN Juan Thayer MD   3 mg at 06/29/25 2150    OLANZapine (zyPREXA) tablet 10 mg  10 mg Oral TID PRN Juan Thayer MD        Or    OLANZapine (zyPREXA) injection 10 mg  10 mg Intramuscular TID PRN Juan Thayer MD        senna-docusate (SENOKOT-S/PERICOLACE) 8.6-50 MG per tablet 1 tablet  1 tablet Oral BID PRN Juan Thayer MD        traZODone (DESYREL) tablet 50 mg  50 mg Oral At Bedtime PRN Jeovany,  "MD Juan   50 mg at 06/29/25 2020       Labs and Imaging:  New results:   No results found for this or any previous visit (from the past 24 hours).      Data this admission:  - CBC unremarkable  - CMP unremarkable  - TSH normal  - UDS negative  - Vit D pending  - Hgb A1c at 5.7%  - Lipids : HDL chol - 40;   - Vit B12 and folate normal  - Urinalysis unremarkable  - EKG Qtc - 433 ; Sinus rhythm    Left axis deviation  Nonspecific T wave abnormality  Abnormal ECG  When compared with ECG of 29-Jun-2025 10:02, (unconfirmed)  QRS axis Shifted left  Nonspecific T wave abnormality now evident in Lateral leads     Mental Status Exam:     Oriented to:  Grossly Oriented  General:  Awake and Alert  Appearance:  appears stated age and Grooming is adequate  Behavior/Attitude:  Cooperative and Open  Eye Contact: Appropriate  Psychomotor: Normal No evidence of tics.  Speech:  appropriate volume/tone  Language: Fluent in English with appropriate syntax and vocabulary.  Mood:  \"okay\"  Affect:  stable  Thought Process:  coherent  Thought Content:   No SI/HI/AH/VH; delusions of paranoia  Associations:  intact  Insight:  good  Judgment:  good due to   Impulse control: fair  Attention Span:  grossly intact  Concentration:  grossly intact  Recent and Remote Memory:  not formally assessed  Fund of Knowledge: average  Muscle Strength and Tone: normal  Gait and Station: Normal     Psychiatric Assessment   Margoth Ludwig is a 55 year old female with no past psychiatric history who presented voluntarily with increased anxiety and possible delusions/psychosis.      There is on past psychiatric diagnoses or hospitalization. Significant symptoms on admission include paranoia and delusions of people hacking her work and personal devices, increased anxiety, and decreased sleep. The MSE on admission was pertinent for anxious affect.     Biologically, patient does not appear to have any medical co-morbidities or history that's seem to " "contribution to current presentation. Of note, patient is post-menopausal so recent hormonal changes may be contributing. Psychologically, patient has good insight into her symptoms and is coherent in explaining her worries. She reports hobbies and coping skills she utilizes. Socially, she has a stable job, living, and trusting . Protective factor includes engaged in treatment, open to receiving help, and strong family and theresa.         In summary, the patient's reported symptoms of sleep disturbances, paranoia, and anxiety is not consist with a diagnosis at this time and a definitive diagnosis is still in evolution. Unspecified anxiety disorder with possible unspecific psychotic disorder - would need to collect collateral to verify if the paranoia/delusions of people hacking is true or not. At this time, patient does not want writer to call family or anyone for collateral. No jennifer history appreciated by patient report today. No substances and UDS negative.      Given that she currently has high anxiety and paranoia/delusions, patient warrants inpatient psychiatric hospitalization to maintain her safety.      Psychiatric Plan by Diagnosis      Today's changes:  - Started aripiprazole 5 mg   - \"Navigate program\"     #anxiety, unspecified   # psychosis, unspecified  1. Medications:  - started aripiprazole 5 mg morning  - olanzapine 10 mg TID PRN for agitation   - hydroxyzine PRN for anxiety   - melatonin PRN for sleep      2. Pertinent Labs/Monitoring:   - EKG for qtc (433) monitor  - consider first episode psychosis work up      3. Additional Plans:  - Patient will be treated in therapeutic milieu with appropriate individual and group therapies as described.      Psychiatric Hospital Course:    Margoth Ludwig was admitted to [facility] as a voluntary patient         6/30: Patient took only PRN trazadone, melatonin, hydroxyzine and was stable. But she has delusional thoughts. Refused to take neuroleptic " (aripiprazole)      7/1: Patient agree to start with ABILIFY 5 mg, and it is started today. Patient is stable.      The risks, benefits, alternatives, and side effects were discussed and understood by the patient and other caregivers.     Medical Assessment and Plan     Medical diagnoses to be addressed this admission:       # recent atypical pneumonia diagnosis   - azithromycin 250 mg for 5 day course starting 6/27. Patient has been taking consistently but not on day of admission. Has some cough remaining but otherwise no SOB/Chest pain.  - resume PTA azithromycin 250 mg for remaining 3 doses          Medical course: Patient was physically examined by the ED prior to being transferred to the unit and was found to be medically stable and appropriate for admission.      Consults:  none     Checklist     Legal Status: Voluntary     Safety Assessment:   Behavioral Orders   Procedures    Code 1 - Restrict to Unit    Routine Programming     As clinically indicated    Status 15     Every 15 minutes.       Risk Assessment:  Risk for harm is low.  Risk factors: family dynamics  Protective factors: family, peers, school, and engaged in treatment     SIO: no    Disposition: tbd. Pending stabilization, medication optimization, & development of a safe discharge plan.     Attestations     Fidel Coombs, MS3  [facility] Medical Student     I was present with the medical student who participated in the service and in the documentation of the note.  I have verified the history and personally performed the physical exam and medical decision making. I agree with the assessment and plan of care as documented in the note.    This patient was seen and discussed with my attending physician.  Jovita Delgado MD  Psychiatry Resident Physician      This patient has been seen and evaluated by me, Juan Engel.  I have discussed this patient with the psychiatry resident and I agree with the findings and plan in this note.    I have  reviewed today's vital signs, medications, labs and imaging.     Juan Velez MD

## 2025-07-01 NOTE — PLAN OF CARE
SHIFT NURSING PLAN OF CARE   Problem: Anxiety Signs/Symptoms  Goal: Improved Mood Symptoms (Anxiety Signs/Symptoms)  Outcome: Progressing     Problem: Psychotic Signs/Symptoms  Goal: Improved Behavioral Control (Psychotic Signs/Symptoms)  Outcome: Progressing   Goal Outcome Evaluation:    Pt spent majority of the shift in the lounge watching TV; socially withdrawn and kept to herself; denied all MH symptoms including SI/SIB,AVH,depression and anxiety; calm and pleasant with care; C/O feeling dizzy while sitting and requested for VS assessment; VS stable and encourage fluid; ate dinner and snack; appetite is good; no PRN requested or indicated. Pt had a visit and the visit appeared went well.

## 2025-07-02 PROCEDURE — 124N000002 HC R&B MH UMMC

## 2025-07-02 PROCEDURE — 250N000013 HC RX MED GY IP 250 OP 250 PS 637

## 2025-07-02 PROCEDURE — 99232 SBSQ HOSP IP/OBS MODERATE 35: CPT | Mod: GC | Performed by: PSYCHIATRY & NEUROLOGY

## 2025-07-02 PROCEDURE — H2032 ACTIVITY THERAPY, PER 15 MIN: HCPCS

## 2025-07-02 RX ADMIN — TRAZODONE HYDROCHLORIDE 50 MG: 50 TABLET ORAL at 00:43

## 2025-07-02 RX ADMIN — Medication 3 MG: at 20:06

## 2025-07-02 RX ADMIN — TRAZODONE HYDROCHLORIDE 50 MG: 50 TABLET ORAL at 22:57

## 2025-07-02 RX ADMIN — SENNOSIDES AND DOCUSATE SODIUM 1 TABLET: 50; 8.6 TABLET ORAL at 20:06

## 2025-07-02 RX ADMIN — ARIPIPRAZOLE 5 MG: 5 TABLET ORAL at 08:19

## 2025-07-02 ASSESSMENT — ACTIVITIES OF DAILY LIVING (ADL)
ADLS_ACUITY_SCORE: 20
ADLS_ACUITY_SCORE: 20
HYGIENE/GROOMING: INDEPENDENT
ADLS_ACUITY_SCORE: 20
ORAL_HYGIENE: INDEPENDENT
HYGIENE/GROOMING: INDEPENDENT
ADLS_ACUITY_SCORE: 20
DRESS: INDEPENDENT
ADLS_ACUITY_SCORE: 20
DRESS: INDEPENDENT
ADLS_ACUITY_SCORE: 20
ADLS_ACUITY_SCORE: 20
ORAL_HYGIENE: INDEPENDENT
ADLS_ACUITY_SCORE: 20
LAUNDRY: WITH SUPERVISION
LAUNDRY: WITH SUPERVISION

## 2025-07-02 NOTE — PLAN OF CARE
"Problem: Adult Behavioral Health Plan of Care  Goal: Plan of Care Review  Outcome: Progressing  Flowsheets (Taken 7/2/2025 1234)  Plan of Care Reviewed With: patient  Overall Patient Progress: improving  Patient Agreement with Plan of Care: agrees  Goal: Develops/Participates in Therapeutic [place] to Support Successful Transition  Outcome: Progressing  Flowsheets (Taken 7/2/2025 1234)  Develops/Participates in Therapeutic [place] to Support Successful Transition: making progress toward outcome     Problem: Psychotic Signs/Symptoms  Goal: Improved Behavioral Control (Psychotic Signs/Symptoms)  Outcome: Progressing  Flowsheets (Taken 7/2/2025 1234)  Mutually Determined Action Steps (Improved Behavioral Control): verbalizes gratifying activity  Goal: Optimal Cognitive Function (Psychotic Signs/Symptoms)  Outcome: Progressing  Flowsheets (Taken 7/2/2025 1234)  Mutually Determined Action Steps (Optimal Cognitive Function): participates in problem resolution  Goal: Increased Participation and Engagement (Psychotic Signs/Symptoms)  Outcome: Progressing  Goal: Enhanced Social, Occupational or Functional Skills (Psychotic Signs/Symptoms)  Outcome: Progressing   Goal Outcome Evaluation:      Plan of Care Reviewed With: patient    Overall Patient Progress: improvingOverall Patient Progress: improving     Margoth OOB for VS, bkft and meds-explained recent complaints of feeling lightheaded as being dehydrated-states now that she has increased water intake she no longer feels lightheaded- c/o pain lower leg, but when approached by RN denied saying it was resolved by eating a banana and drinking milk- Margoth briefly attended one grp-otherwise sitting in lounge or off and on resting in bed- states she is okay being in hospital as she doesn't \"want to relapse\" by leaving too early-states it is difficult to be here as she misses her family and will especially miss being with them on Fourth of July-niece coming to visit on 4th with her " "children- Margoth states she lives with  of 31 years-never had children- states they usually get along, \"when I'm not..\"- did not clarify specifics- when asked how  responds to her when she is upset Margoth said, \"He just says don't worry Margoth, everything is going to be okay.\"- Margoth reports she is feeling less anxious- seems easily confused-this afternoon seeking discharge from [facility] as[facility]oc who has been on the floor since 0700- later told RN she thought he was her doctor, although also aware Dr Engel is MD-            "

## 2025-07-02 NOTE — PLAN OF CARE
Problem: Sleep Disturbance  Goal: Adequate Sleep/Rest  Outcome: Progressing   Goal Outcome Evaluation:Progressing    Patient was awake at start of the shift, requested and received prn Melatonin and Trazodone. She woke up around 0130, woke up her roommate and asked roommate to tell staff that she felt light headed. VSS. Encouraged to drink more fluids and drank a cup of water at that time before going back to bed  only to wake up about an hour later. Had difficulty falling and staying asleep. She read in the lounge. Declined offered tea to aid with sleep. Slept for about 5 hours.

## 2025-07-02 NOTE — PROGRESS NOTES
Rehab Group    Start time: 1115  End time: 1215  Patient time total: 45 minutes    attended full group    #2 attended   Group Type: art   Group Topic Covered: activity therapy       Group Session Detail:  Art Therapy directive was to identify and express through chosen art materials a sensory item from a real or imagined safe/peaceful place.  Goals of directive: emotional expression, trauma containment, media exploration, mindfulness     Patient Response/Contribution:  cooperative with task       Patient Detail:    Pt was an engaged participant, focused on task for the full duration of group. Pt finished artwork and briefly verbally processed with author and group. Pt shanika small symbols to express five sensory items from pts safe place (Pt said this place is imaginary to here) including waterfalls which pt finds calming.  Pts mood was calm, pleasant participant.      Activity Therapy Per 15 min ()      Patient Active Problem List   Diagnosis    Psychosis (H)

## 2025-07-02 NOTE — PROGRESS NOTES
"  ----------------------------------------------------------------------------------------------------------  [facility]  Psychiatry Progress Note  Hospital Day #3     Interim History:     The patient's care was discussed with the treatment team and chart notes were reviewed.    Vitals: VSS  Sleep: 5 hours (07/02/25 0626)  Scheduled medications: Took all scheduled medications as prescribed  Psychiatric PRN medications:   Last 24H PRN:     melatonin tablet 3 mg, 3 mg at 07/01/25 2345    traZODone (DESYREL) tablet 50 mg, 50 mg at 07/02/25 0043   Staff Report:   No acute events or safety concerns overnight. Please see staff notes for details.      Subjective:     Patient Interview:  Patient said she was feeling \"confused\" today. She said she thought her  was being accused of kidnapping her and her mother. She started to describe a waterfall she went to as a kid. Her , her mother and her had gone to the waterfall and he had taken a picture. This picture had been put somewhere saying that she was missing. She then said \"everything is running around in my head... all this violence triggers things in my head\". She said she didn't know what is happening but that she told her  about it. She got quiet and said \"too much stuff going on\". She spoke up again and said she didn't see her  last night. Last night the patient reported that she got medication to go to sleep but she stayed up and watched TV. She started feeling dizzy around noon after getting the new medication.    She mentioned she has a doctors appointment tomorrow that she has not cancelled in [place]. Requested help cancelling the appointment.       ROS:  Patient has no bothersome physical symptoms  Patient denies acute concerns     Objective:     Vitals:  /67   Pulse 99   Temp 98.2  F (36.8  C) (Temporal)   Resp 16   Ht 1.626 m (5' 4\")   Wt 60.6 kg (133 lb 11.2 oz)   SpO2 98% "   BMI 22.95 kg/m      Allergies:  Allergies   Allergen Reactions    Clindamycin Hives    Erythromycin Hives    Penicillins Hives    Amoxicillin Hives       Current Medications:  Scheduled:  Current Facility-Administered Medications   Medication Dose Route Frequency Provider Last Rate Last Admin    acetaminophen (TYLENOL) tablet 650 mg  650 mg Oral Q4H PRN Juan Thayer MD   650 mg at 06/30/25 0854    alum & mag hydroxide-simethicone (MAALOX) suspension 30 mL  30 mL Oral Q4H PRN Juan Thayer MD        ARIPiprazole (ABILIFY) tablet 5 mg  5 mg Oral Daily Juan Thayer MD   5 mg at 07/02/25 0819    hydrOXYzine HCl (ATARAX) tablet 25 mg  25 mg Oral Q4H PRN Juan Thayer MD   25 mg at 06/30/25 2132    melatonin tablet 3 mg  3 mg Oral At Bedtime PRN Juan Thayer MD   3 mg at 07/01/25 2345    OLANZapine (zyPREXA) tablet 10 mg  10 mg Oral TID PRN Juan Thayer MD        Or    OLANZapine (zyPREXA) injection 10 mg  10 mg Intramuscular TID PRN Juan Thayer MD        senna-docusate (SENOKOT-S/PERICOLACE) 8.6-50 MG per tablet 1 tablet  1 tablet Oral BID PRN Juan Thayer MD        traZODone (DESYREL) tablet 50 mg  50 mg Oral At Bedtime PRN Juan Thayer MD   50 mg at 07/02/25 0043       PRN:  Current Facility-Administered Medications   Medication Dose Route Frequency Provider Last Rate Last Admin    acetaminophen (TYLENOL) tablet 650 mg  650 mg Oral Q4H PRN Juan Thayer MD   650 mg at 06/30/25 0854    alum & mag hydroxide-simethicone (MAALOX) suspension 30 mL  30 mL Oral Q4H PRN Juan Thayer MD        ARIPiprazole (ABILIFY) tablet 5 mg  5 mg Oral Daily Juan Thayer MD   5 mg at 07/02/25 0819    hydrOXYzine HCl (ATARAX) tablet 25 mg  25 mg Oral Q4H PRN Juan Thayer MD   25 mg at 06/30/25 2132    melatonin tablet 3 mg  3 mg Oral At Bedtime PRN Juan Thayer MD   3 mg at 07/01/25 2345    OLANZapine (zyPREXA) tablet 10 mg  10 mg Oral TID PRN Juan Thayer MD        Or    OLANZapine (zyPREXA) injection 10 mg  10  "mg Intramuscular TID PRN Juan Thayer MD        senna-docusate (SENOKOT-S/PERICOLACE) 8.6-50 MG per tablet 1 tablet  1 tablet Oral BID PRN Juan Thayer MD        traZODone (DESYREL) tablet 50 mg  50 mg Oral At Bedtime PRN Juan Thayer MD   50 mg at 07/02/25 0043       Labs and Imaging:  New results:   No results found for this or any previous visit (from the past 24 hours).      Data this admission:  - CBC unremarkable  - CMP unremarkable  - TSH normal  - UDS negative  - Vit D pending  - Hgb A1c at 5.7%  - Lipids : HDL chol - 40;   - Vit B12 and folate normal  - Urinalysis unremarkable  - EKG Qtc - 433 ; Sinus rhythm    Left axis deviation  Nonspecific T wave abnormality  Abnormal ECG  When compared with ECG of 29-Jun-2025 10:02, (unconfirmed)  QRS axis Shifted left  Nonspecific T wave abnormality now evident in Lateral leads     Mental Status Exam:     Oriented to:  Grossly Oriented  General:  Awake and Alert  Appearance:  appears stated age and Grooming is adequate  Behavior/Attitude:  Cooperative and Open  Eye Contact: Appropriate  Psychomotor: Normal No evidence of tics.  Speech:  appropriate volume/tone  Language: Fluent in English with appropriate syntax and vocabulary.  Mood:  \"okay\"  Affect:  stable  Thought Process:  coherent  Thought Content:   No SI/HI/AH/VH; delusions of paranoia  Associations:  intact  Insight:  good  Judgment:  good due to   Impulse control: fair  Attention Span:  grossly intact  Concentration:  grossly intact  Recent and Remote Memory:  not formally assessed  Fund of Knowledge: average  Muscle Strength and Tone: normal  Gait and Station: Normal     Psychiatric Assessment   Margoth Ludwig is a 55 year old female with no past psychiatric history who presented voluntarily with increased anxiety and possible delusions/psychosis.      There is on past psychiatric diagnoses or hospitalization. Significant symptoms on admission include paranoia and delusions of people hacking her " "work and personal devices, increased anxiety, and decreased sleep. The MSE on admission was pertinent for anxious affect.     Biologically, patient does not appear to have any medical co-morbidities or history that's seem to contribution to current presentation. Of note, patient is post-menopausal so recent hormonal changes may be contributing. Psychologically, patient has good insight into her symptoms and is coherent in explaining her worries. She reports hobbies and coping skills she utilizes. Socially, she has a stable job, living, and trusting . Protective factor includes engaged in treatment, open to receiving help, and strong family and theresa.         In summary, the patient's reported symptoms of sleep disturbances, paranoia, and anxiety is not consist with a diagnosis at this time and a definitive diagnosis is still in evolution. Unspecified anxiety disorder with possible unspecific psychotic disorder - would need to collect collateral to verify if the paranoia/delusions of people hacking is true or not. At this time, patient does not want writer to call family or anyone for collateral. No jennifer history appreciated by patient report today. No substances and UDS negative.      Given that she currently has high anxiety and paranoia/delusions, patient warrants inpatient psychiatric hospitalization to maintain her safety.      Psychiatric Plan by Diagnosis      Today's changes:  - continuing aripiprazole 5 mg   - \"Navigate program\"     #anxiety, unspecified   # psychosis, unspecified  1. Medications:  - started aripiprazole 5 mg morning  - olanzapine 10 mg TID PRN for agitation   - hydroxyzine PRN for anxiety   - melatonin PRN for sleep      2. Pertinent Labs/Monitoring:   - EKG for qtc (433) monitor  - consider first episode psychosis work up      3. Additional Plans:  - Patient will be treated in therapeutic milieu with appropriate individual and group therapies as described.      Psychiatric Hospital " Course:    Margoth Ludwig was admitted to [facility] as a voluntary patient         6/30: Patient took only PRN trazadone, melatonin, hydroxyzine and was stable. But she has delusional thoughts. Refused to take neuroleptic (aripiprazole)      7/1: Patient agree to start with ABILIFY 5 mg, and it is started today. Patient is stable.      7/2: No changning in treatment plan. Continuie the monitoring      The risks, benefits, alternatives, and side effects were discussed and understood by the patient and other caregivers.     Medical Assessment and Plan     Medical diagnoses to be addressed this admission:       # recent atypical pneumonia diagnosis   - azithromycin 250 mg for 5 day course starting 6/27. Patient has been taking consistently but not on day of admission. Has some cough remaining but otherwise no SOB/Chest pain.  - resume PTA azithromycin 250 mg for remaining 3 doses          Medical course: Patient was physically examined by the ED prior to being transferred to the unit and was found to be medically stable and appropriate for admission.      Consults:  none     Checklist     Legal Status: Voluntary     Safety Assessment:   Behavioral Orders   Procedures    Code 1 - Restrict to Unit    Routine Programming     As clinically indicated    Status 15     Every 15 minutes.       Risk Assessment:  Risk for harm is low.  Risk factors: family dynamics  Protective factors: family, peers, school, and engaged in treatment     SIO: no    Disposition: tbd. Pending stabilization, medication optimization, & development of a safe discharge plan.     Attestations     Fidel Coombs, MS3  [facility] Medical Student     I was present with the medical student who participated in the service and in the documentation of the note.  I have verified the history and personally performed the physical exam and medical decision making. I agree with the assessment and plan of care as documented in the note.    This patient was seen and  discussed with my attending physician.  Jovita Delgado MD  Psychiatry Resident Physician      This patient has been seen and evaluated by me, Juan Engel.  I have discussed this patient with the psychiatry resident and I agree with the findings and plan in this note.    I have reviewed today's vital signs, medications, labs and imaging.     Juan Velez MD

## 2025-07-02 NOTE — PLAN OF CARE
BEH IP Unit Acuity Rating Score (UARS)  Patient is given one point for every criteria they meet.    CRITERIA SCORING   On a 72 hour hold, court hold, committed, stay of commitment, or revocation. 0    Patient LOS on BEH unit exceeds 20 days. 0  LOS: 3   Patient under guardianship, 55+, otherwise medically complex, or under age 11. 1   Suicide ideation without relief of precipitating factors. 0   Current plan for suicide. 0   Current plan for homicide. 0   Imminent risk or actual attempt to seriously harm another without relief of factors precipitating the attempt. 1   Severe dysfunction in daily living (ex: complete neglect for self care, extreme disruption in vegetative function, extreme deterioration in social interactions). 1   Recent (last 7 days) or current physical aggression in the ED or on unit. 0   Restraints or seclusion episode in past 72 hours. 0   Recent (last 7 days) or current verbal aggression, agitation, yelling, etc., while in the ED or unit. 0   Active psychosis. 1   Need for constant or near constant redirection (from leaving, from others, etc).  1   Intrusive or disruptive behaviors. 1   Patient requires 3 or more hours of individualized nursing care per 8-hour shift (i.e. for ADLs, meds, therapeutic interventions). 0   TOTAL 5

## 2025-07-02 NOTE — PLAN OF CARE
Team Note Due:  Monday    Assessment/Intervention/Current Symtoms and Care Coordination:  Chart review and met with team, discussed pt progress, symptomology, and response to treatment.  Discussed the discharge plan and any potential impediments to discharge.    -Called [facility] to cancel existing appointment for tomorrow.       Discharge Plan or Goal:  Home with outpatient services     Barriers to Discharge:  Ongoing symptoms  Medication management       Referral Status:  None currently      Legal Status:  Voluntary       Contacts (include SHAHBAZ status):  Primary Care Provider:  Name/Clinic: [facility]              Number: [phone]  No SHAHBAZ     Other contact information (family, friends, SO) and SHAHBAZ status:      Dev, , [phone], Rescinded previously signed SHAHBAZ   Michaela Amezquita, sister, [phone], SHAHBAZ Signed      Upcoming Meetings and Dates/Important Information and next steps:  None currently

## 2025-07-03 VITALS
HEART RATE: 88 BPM | RESPIRATION RATE: 16 BRPM | WEIGHT: 132 LBS | HEIGHT: 64 IN | TEMPERATURE: 99.3 F | DIASTOLIC BLOOD PRESSURE: 84 MMHG | SYSTOLIC BLOOD PRESSURE: 131 MMHG | BODY MASS INDEX: 22.53 KG/M2 | OXYGEN SATURATION: 95 %

## 2025-07-03 PROCEDURE — 99232 SBSQ HOSP IP/OBS MODERATE 35: CPT | Mod: GC | Performed by: PSYCHIATRY & NEUROLOGY

## 2025-07-03 PROCEDURE — 97150 GROUP THERAPEUTIC PROCEDURES: CPT | Mod: GO

## 2025-07-03 PROCEDURE — 124N000002 HC R&B MH UMMC

## 2025-07-03 PROCEDURE — 250N000013 HC RX MED GY IP 250 OP 250 PS 637

## 2025-07-03 PROCEDURE — 90853 GROUP PSYCHOTHERAPY: CPT

## 2025-07-03 RX ORDER — TRAZODONE HYDROCHLORIDE 100 MG/1
100 TABLET ORAL
Status: DISPENSED | OUTPATIENT
Start: 2025-07-03

## 2025-07-03 RX ORDER — ARIPIPRAZOLE 10 MG/1
10 TABLET ORAL DAILY
Status: ACTIVE | OUTPATIENT
Start: 2025-07-04

## 2025-07-03 RX ADMIN — Medication 3 MG: at 20:10

## 2025-07-03 RX ADMIN — ARIPIPRAZOLE 5 MG: 5 TABLET ORAL at 08:31

## 2025-07-03 ASSESSMENT — ACTIVITIES OF DAILY LIVING (ADL)
ADLS_ACUITY_SCORE: 20
DRESS: INDEPENDENT
ADLS_ACUITY_SCORE: 20
ORAL_HYGIENE: INDEPENDENT
HYGIENE/GROOMING: INDEPENDENT
ADLS_ACUITY_SCORE: 20
LAUNDRY: WITH SUPERVISION
ADLS_ACUITY_SCORE: 20
LAUNDRY: WITH SUPERVISION
ADLS_ACUITY_SCORE: 20
ADLS_ACUITY_SCORE: 20
DRESS: INDEPENDENT
ADLS_ACUITY_SCORE: 20
ADLS_ACUITY_SCORE: 20
HYGIENE/GROOMING: INDEPENDENT
ADLS_ACUITY_SCORE: 20
ADLS_ACUITY_SCORE: 20
ORAL_HYGIENE: INDEPENDENT

## 2025-07-03 NOTE — PLAN OF CARE
Team Note Due:  Monday    Assessment/Intervention/Current Symtoms and Care Coordination:  Chart review and met with team, discussed pt progress, symptomology, and response to treatment.  Discussed the discharge plan and any potential impediments to discharge.     Discharge Plan or Goal:  Home with outpatient services     Barriers to Discharge:  Ongoing symptoms  Medication management       Referral Status:  None currently      Legal Status:  Voluntary       Contacts (include SHAHBAZ status):  Primary Care Provider:  Name/Clinic: [facility]              Number: [phone]  No SHAHBAZ     Other contact information (family, friends, SO) and SHAHBAZ status:      Dev, , [phone], SHAHBAZ signed 6/30  Michaela Amezquita, sister, [phone], SHAHBAZ Signed      Upcoming Meetings and Dates/Important Information and next steps:  None currently

## 2025-07-03 NOTE — PROGRESS NOTES
"  ----------------------------------------------------------------------------------------------------------  [facility]  Psychiatry Progress Note  Hospital Day #4     Interim History:     The patient's care was discussed with the treatment team and chart notes were reviewed.    Vitals: VSS  Sleep: 4 hours (07/03/25 0606)  Scheduled medications: Took all scheduled medications as prescribed  Psychiatric PRN medications:   Last 24H PRN:     melatonin tablet 3 mg, 3 mg at 07/02/25 2006    senna-docusate (SENOKOT-S/PERICOLACE) 8.6-50 MG per tablet 1 tablet, 1 tablet at 07/02/25 2006    traZODone (DESYREL) tablet 50 mg, 50 mg at 07/02/25 5093   Staff Report:   No acute events or safety concerns overnight. Please see staff notes for details.      Subjective:     Patient Interview:    Margoth reports feeling \"much better\" today aside from dropping lots of jewels while doing arts and crafts. Does not thing she was having a hard time using her hands. She also enjoyed a couple word puzzles then took a nap because she didn't get much sleep last night. Reports feeling a little bit tired but not terrible.     Margoth chatted with her  today which went well. She still thinks that he is hiding something from her. The reason being is that they had a conversation where he advocated for her to get a bunch of things checked out by the doctor like he wasn't going to be around anymore. She is suspicious that he is cheating on her with a specific lady that sold them their family cars because she found a bottle of Viagra that she hadn't noticed him using prior. Margoth is tentatively okay with the team contacting her  over the phone as collateral.     She does report some confusion regarding the fact that her doctors continue to switch, people's clothes change, and she is only able to have visitors 1 hour out of the day. She also thinks noises are more pronounced which has also been " "anxiety provoking. She does mention concern the some patients on the unit are yelling out negative things that Margoth said about other people which has also been anxiety provoking. Denies any VH, AH, or SI/HI.    ROS:  Patient has no bothersome physical symptoms  Patient denies acute concerns     Objective:     Vitals:  /75 (BP Location: Left arm, Patient Position: Sitting, Cuff Size: Adult Regular)   Pulse 82   Temp 99.6  F (37.6  C) (Temporal)   Resp 16   Ht 1.626 m (5' 4\")   Wt 60.6 kg (133 lb 11.2 oz)   SpO2 95%   BMI 22.95 kg/m      Allergies:  Allergies   Allergen Reactions    Clindamycin Hives    Erythromycin Hives    Penicillins Hives    Amoxicillin Hives       Current Medications:  Scheduled:  Current Facility-Administered Medications   Medication Dose Route Frequency Provider Last Rate Last Admin    acetaminophen (TYLENOL) tablet 650 mg  650 mg Oral Q4H PRN Juan Thayer MD   650 mg at 06/30/25 0854    alum & mag hydroxide-simethicone (MAALOX) suspension 30 mL  30 mL Oral Q4H PRN Juan Thayer MD        ARIPiprazole (ABILIFY) tablet 5 mg  5 mg Oral Daily Juan Thayer MD   5 mg at 07/02/25 0819    hydrOXYzine HCl (ATARAX) tablet 25 mg  25 mg Oral Q4H PRN Juan Thayer MD   25 mg at 06/30/25 2132    melatonin tablet 3 mg  3 mg Oral At Bedtime PRN Juan Thayer MD   3 mg at 07/02/25 2006    OLANZapine (zyPREXA) tablet 10 mg  10 mg Oral TID PRN Juan Thayer MD        Or    OLANZapine (zyPREXA) injection 10 mg  10 mg Intramuscular TID PRN Juan Thayer MD        senna-docusate (SENOKOT-S/PERICOLACE) 8.6-50 MG per tablet 1 tablet  1 tablet Oral BID PRN Juan Thayer MD   1 tablet at 07/02/25 2006    traZODone (DESYREL) tablet 50 mg  50 mg Oral At Bedtime PRN Juan Thayer MD   50 mg at 07/02/25 2257       PRN:  Current Facility-Administered Medications   Medication Dose Route Frequency Provider Last Rate Last Admin    acetaminophen (TYLENOL) tablet 650 mg  650 mg Oral Q4H PRN Juan Thayer, " "MD   650 mg at 06/30/25 0854    alum & mag hydroxide-simethicone (MAALOX) suspension 30 mL  30 mL Oral Q4H PRN Juan Thayer MD        ARIPiprazole (ABILIFY) tablet 5 mg  5 mg Oral Daily Juan Thayer MD   5 mg at 07/02/25 0819    hydrOXYzine HCl (ATARAX) tablet 25 mg  25 mg Oral Q4H PRN Juan Thayer MD   25 mg at 06/30/25 2132    melatonin tablet 3 mg  3 mg Oral At Bedtime PRN Juan Thayer MD   3 mg at 07/02/25 2006    OLANZapine (zyPREXA) tablet 10 mg  10 mg Oral TID PRN Juan Thayer MD        Or    OLANZapine (zyPREXA) injection 10 mg  10 mg Intramuscular TID PRN Juan Thayer MD        senna-docusate (SENOKOT-S/PERICOLACE) 8.6-50 MG per tablet 1 tablet  1 tablet Oral BID PRN Juan Thayer MD   1 tablet at 07/02/25 2006    traZODone (DESYREL) tablet 50 mg  50 mg Oral At Bedtime PRN Juan Thayer MD   50 mg at 07/02/25 2543       Labs and Imaging:  New results:   No results found for this or any previous visit (from the past 24 hours).      Data this admission:  - CBC unremarkable  - CMP unremarkable  - TSH normal  - UDS negative  - Vit D pending  - Hgb A1c at 5.7%  - Lipids : HDL chol - 40;   - Vit B12 and folate normal  - Urinalysis unremarkable  - EKG Qtc - 433 ; Sinus rhythm    Left axis deviation  Nonspecific T wave abnormality  Abnormal ECG  When compared with ECG of 29-Jun-2025 10:02, (unconfirmed)  QRS axis Shifted left  Nonspecific T wave abnormality now evident in Lateral leads     Mental Status Exam:     Oriented to:  Grossly Oriented  General:  Awake and Alert  Appearance:  appears stated age and Grooming is adequate  Behavior/Attitude:  Cooperative and Open  Eye Contact: Appropriate  Psychomotor: Normal No evidence of tics.  Speech:  appropriate volume/tone  Language: Fluent in English with appropriate syntax and vocabulary.  Mood:  \"much better\"  Affect:  stable  Thought Process:  coherent  Thought Content:   No SI/HI/AH/VH; delusions of paranoia  Associations:  intact  Insight:  " good  Judgment:  good  Impulse control: fair  Attention Span:  grossly intact  Concentration:  grossly intact  Recent and Remote Memory:  not formally assessed  Fund of Knowledge: average  Muscle Strength and Tone: normal  Gait and Station: Normal     Psychiatric Assessment   Margoth Ludwig is a 55 year old female with no past psychiatric history who presented voluntarily with increased anxiety and possible delusions/psychosis.      There is no past psychiatric diagnoses or hospitalization. Significant symptoms on admission include paranoia and delusions of people hacking her work and personal devices, increased anxiety, and decreased sleep. The MSE on admission was pertinent for anxious affect. Throughout her stay she has had a variety of non-bizarre paranoid delusion, including but not limited to her  cheating on her, that people are trying to claim her  kidnapped her mother and that her  is going to die or leave her soon. These will change between conversations though often involve similar people or themes.     Biologically, patient does not appear to have any medical co-morbidities or history that's seem to contribution to current presentation. Thyroid labs, CBC and BMP are normal. Screen for alcohol or recreational drug use was negative. Of note, patient is post-menopausal so recent hormonal changes may be contributing. Psychologically, patient has good insight into her symptoms and is coherent in explaining her worries. She reports hobbies and coping skills she utilizes. Socially, she has a stable job, living, and trusting . Protective factor includes engaged in treatment, open to receiving help, and strong family and theresa.         In summary, the patient's new onset of sleep disturbances, paranoia, auditory hallucinations and anxiety is most consist with a first time psychotic episode. 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  Unspecified anxiety disorder with possible unspecific psychotic disorder. At this time, patient does not want writer to call family or anyone for collateral. No jennifer history appreciated by patient report today. No substances and UDS negative.      Given that she currently has high anxiety and paranoia/delusions, patient warrants inpatient psychiatric hospitalization to maintain her safety.      Psychiatric Plan by Diagnosis      Today's changes:  - Increased aripiprazole from 5 mg to 10 mg  - Increased trazodone from 50 mg to 100 mg     #anxiety, unspecified   # psychosis, unspecified  1. Medications:  - po aripiprazole 10 mg morning  - po olanzapine 10 mg TID PRN for agitation   - po hydroxyzine PRN for anxiety   - po melatonin PRN for sleep   - po trazodone 100 mg PRN for sleep     2. Pertinent Labs/Monitoring:   - EKG for qtc (433) monitor  - consider further first episode psychosis work up (treponema, Lyme, head MRI)     3. Additional Plans:  - Patient will be treated in therapeutic milieu with appropriate individual and group therapies as described.      Psychiatric Hospital Course:    Margoth Ludwig was admitted to [facility] as a voluntary patient         6/30: Patient took only PRN trazadone, melatonin, hydroxyzine and was stable. But she has delusional thoughts. Refused to take neuroleptic (aripiprazole)      7/1: Patient agree to start with ABILIFY 5 mg, and it is started today.      7/3: ABILIFY increased to 10 mg, PRN trazodone increased to 100 mg      The risks, benefits, alternatives, and side effects were discussed and understood by the patient and other caregivers.     Medical Assessment and Plan     Medical diagnoses to be addressed this admission:       # recent atypical pneumonia diagnosis   - azithromycin 250 mg for 5 day course starting 6/27. Patient has been taking consistently but not on day of admission. Has some cough remaining but otherwise no SOB/Chest pain.  - resume PTA azithromycin  250 mg for remaining 3 doses    - Consider OP HRT     Medical course: Patient was physically examined by the ED prior to being transferred to the unit and was found to be medically stable and appropriate for admission.      Consults:  none     Checklist     Legal Status: Voluntary     Safety Assessment:   Behavioral Orders   Procedures    Code 1 - Restrict to Unit    Routine Programming     As clinically indicated    Status 15     Every 15 minutes.       Risk Assessment:  Risk for harm is low.  Risk factors: family dynamics  Protective factors: family, peers, school, and engaged in treatment     SIO: no    Disposition: tbd. Pending stabilization, medication optimization, & development of a safe discharge plan.     Attestations     Teresa Mena, MS3  [facility] Medical Student     I was present with the medical student who participated in the service and in the documentation of the note.  I have verified the history and personally performed the physical exam and medical decision making. I agree with the assessment and plan of care as documented in the note.    This patient was seen and discussed with my attending physician.  Jovita Delgado MD  Psychiatry Resident Physician        This patient has been seen and evaluated by me, Juan Engel.  I have discussed this patient with the psychiatry resident and I agree with the findings and plan in this note.    I have reviewed today's vital signs, medications, labs and imaging.     Juan Velez MD

## 2025-07-03 NOTE — PLAN OF CARE
Problem: Adult Behavioral Health Plan of Care  Goal: Plan of Care Review  Outcome: Progressing  Flowsheets (Taken 7/3/2025 1113)  Plan of Care Reviewed With: patient  Overall Patient Progress: improving  Patient Agreement with Plan of Care: agrees  Goal: Develops/Participates in Therapeutic Huffman to Support Successful Transition  Outcome: Progressing  Flowsheets (Taken 7/3/2025 1113)  Develops/Participates in Therapeutic Huffman to Support Successful Transition: making progress toward outcome     Problem: Anxiety Signs/Symptoms  Goal: Enhanced Social, Occupational or Functional Skills (Anxiety Signs/Symptoms)  Outcome: Progressing  Flowsheets (Taken 7/3/2025 1113)  Mutually Determined Action Steps (Enhanced Social, Occupational or Functional Skills): identifies support resources     Problem: Psychotic Signs/Symptoms  Goal: Improved Behavioral Control (Psychotic Signs/Symptoms)  Outcome: Progressing   Goal Outcome Evaluation:    Plan of Care Reviewed With: patient Plan of Care Reviewed With: patient    Overall Patient Progress: improvingOverall Patient Progress: improving     Margoth reports improved sleep last night, although noted to have slept only four hours which is less than past two nights-showered on own initiative-reluctant to use shower because sign was set on occupied, despite door open and Margoth was able to see room was empty and psych assoc confirmed it had been cleaned-Margoth continues to comment about feeling a bit confused, but did not give specifics-very sad she is in hospital over 4th of July holiday- hoping  will come to visit, despite family activities

## 2025-07-03 NOTE — PLAN OF CARE
Group Attendance:  attended full group    Time session began: 1415  Time session ended: 1500  Patient's total time in group: 45    Total # Attendees   3   Group Type psychotherapeutic     Group Topic Covered emotional regulation, symptom management, healthy coping skills, mindfulness, and relaxation and grounding techniques/coping skills     Group Session Detail Group members checked in with how they are feeling. The group discussed utilizing music as a therapeutic tool to explore emotions, expression, and promote relaxation. Writer discussed the significance of music in emotional regulation and coping strategies. Group members shared personal experiences and associations with songs they chose.      Patient's response to the group topic/interactions:  cooperative with task, organized, socially appropriate, listened actively, expressed understanding of topic, and attentive     Patient Details: Pt attended group, briefly checked in, chose a song, and listened to the music while working on a word find.           [id number] - Group psychotherapy - 1 Session  Patient Active Problem List   Diagnosis    Psychosis (H)

## 2025-07-03 NOTE — PLAN OF CARE
"NOC PLAN OF CARE   Problem: Sleep Disturbance  Goal: Adequate Sleep/Rest  Outcome: Progressing   Goal Outcome Evaluation:    Pt was widely awake at beginning of shift, approached nursing desk and requested SHAHBAZ paper to sign but didn't return back;  at one point, Pt asked staff doing safety rounding to charge the phone and call Dev(spouse) to pick her up; pt also said that she can't fall asleep because somebody is keep opening her door. Staff reassured her safety and encouraged her to get back to sleep. Pt behavioral presentation appeared paranoid and disorganized. Pt said, \" I am confused, I don't know why?\" Without elaboration.      Appeared to have slept for 4 hrs       "

## 2025-07-03 NOTE — PROGRESS NOTES
Rehab Group    Start time: 1715  End time: 1800  Patient time total: 45 minutes    attended full group    #5 attended   Group Type: recreation   Group Topic Covered: activity therapy, cognitive activities, healthy leisure time, and problem solving       Group Session Detail:  TR leisure group     Patient Response/Contribution:  cooperative with task, attentive, and actively engaged       Patient Detail:    Pt participated in Therapeutic Recreation group with focus on leisure participation, communication skills, and critical thinking. Engaged and focused in the group recreational activity via a group game.  Pt was a full participant throughout the entire duration of group.  Pt presented with a flat affect, but had a few moments with a brighter affect during the activity.       Activity Therapy Per 15 min ()      Patient Active Problem List   Diagnosis    Psychosis (H)

## 2025-07-03 NOTE — PLAN OF CARE
BEH IP Unit Acuity Rating Score (UARS)  Patient is given one point for every criteria they meet.    CRITERIA SCORING   On a 72 hour hold, court hold, committed, stay of commitment, or revocation. 0    Patient LOS on BEH unit exceeds 20 days. 0  LOS: 4   Patient under guardianship, 55+, otherwise medically complex, or under age 11. 1   Suicide ideation without relief of precipitating factors. 0   Current plan for suicide. 0   Current plan for homicide. 0   Imminent risk or actual attempt to seriously harm another without relief of factors precipitating the attempt. 1   Severe dysfunction in daily living (ex: complete neglect for self care, extreme disruption in vegetative function, extreme deterioration in social interactions). 1   Recent (last 7 days) or current physical aggression in the ED or on unit. 0   Restraints or seclusion episode in past 72 hours. 0   Recent (last 7 days) or current verbal aggression, agitation, yelling, etc., while in the ED or unit. 0   Active psychosis. 1   Need for constant or near constant redirection (from leaving, from others, etc).  1   Intrusive or disruptive behaviors. 1   Patient requires 3 or more hours of individualized nursing care per 8-hour shift (i.e. for ADLs, meds, therapeutic interventions). 0   TOTAL 5

## 2025-07-03 NOTE — PLAN OF CARE
Rehab Group    Start time: 1015  End time: 1150  Patient time total: 55 minutes    attended partial group    #5 attended   Group Type: OT Clinic   Group Topic Covered: balanced lifestyle, coping skills, healthy leisure time, mindfulness, and relaxation      Group Session Detail:    Intervention: Pt participated in a OT Clinic group to facilitate coping skills exploration and creative expression through personally meaningful activities, and to encourage utilization of these healthy coping skills to promote overall health and wellness. Group included clinical observation of social, cognitive and kinesthetic performance skills to inform treatment and safe discharge planning.    Mood/Affect: Anxious,  Pleasant      Plan: Patient encouraged to maintain attendance for continued ongoing support in working towards occupational therapy goals to support overall treatment/care.        Patient Detail:    Joined at start of group and requested to work on a beading project. Was set up with needed materials and given instructions on how to complete. Patient started with smaller/thinner string for beading and dropped the beads off the bracelet x 2. Writer offered a thicker string option to see if this was easier to manage/hold on to, which patient agreed should be easier. Also tried to switch to larger beads. Again, patient dropped beads off string. At this point put all beading supplies away and switched to a word search. Unclear if patient's fine motor skills causing difficulty or was not processing how string needed to be held to avoid beads falling off. Did not offer much response when writer tried to ask what was happening each time. Worked on word search for remaining time until electing to leave about 15 or so minutes later.       [id number] OT Group (2 or more in attendance)    Patient Active Problem List   Diagnosis    Psychosis (H)

## 2025-07-03 NOTE — PLAN OF CARE
SHIFT NURSING PLAN OF CARE   Problem: Anxiety Signs/Symptoms  Goal: Improved Mood Symptoms (Anxiety Signs/Symptoms)  Outcome: Progressing  Flowsheets (Taken 7/2/2025 1901)  Mutually Determined Action Steps (Improved Mood Symptoms):   adheres to medication regimen   shares insight re: need for meds     Problem: Psychotic Signs/Symptoms  Goal: Increased Participation and Engagement (Psychotic Signs/Symptoms)  Outcome: Progressing   Goal Outcome Evaluation:    Pt reported had a good shift; spent majority of the shift in the lounge reading a book and watching TV; attended group; pt denied all MH symptoms including SI/SIB,AVH, depression and anxiety. Pt said that she has only one concern this shift, she has no BM today; offered PRN Senna for constipation and accepted but no result yet. Pt appeared disorganized and struggling with decision making to take PRN medication. Requested PRN intervention for sleep and administered 3 mg melatonin and encouraged to return back if not able fall asleep. Medication compliant; denied medications side effect. Pt appeared paranoid and suspicious asking to read medication label before administration. Requested and received PRN trazodone for sleep. Reported melatonin is not effective.

## 2025-07-04 LAB
ATRIAL RATE - MUSE: 76 BPM
DIASTOLIC BLOOD PRESSURE - MUSE: NORMAL MMHG
INTERPRETATION ECG - MUSE: NORMAL
P AXIS - MUSE: 64 DEGREES
PR INTERVAL - MUSE: 156 MS
QRS DURATION - MUSE: 80 MS
QT - MUSE: 378 MS
QTC - MUSE: 425 MS
R AXIS - MUSE: 13 DEGREES
SYSTOLIC BLOOD PRESSURE - MUSE: NORMAL MMHG
T AXIS - MUSE: 30 DEGREES
VENTRICULAR RATE- MUSE: 76 BPM

## 2025-07-04 PROCEDURE — 124N000002 HC R&B MH UMMC

## 2025-07-04 PROCEDURE — 250N000013 HC RX MED GY IP 250 OP 250 PS 637

## 2025-07-04 PROCEDURE — 97150 GROUP THERAPEUTIC PROCEDURES: CPT | Mod: GO

## 2025-07-04 RX ORDER — VITAMIN B COMPLEX
50 TABLET ORAL DAILY
Status: DISCONTINUED | OUTPATIENT
Start: 2025-07-04 | End: 2025-07-09 | Stop reason: HOSPADM

## 2025-07-04 RX ADMIN — Medication 50 MCG: at 09:04

## 2025-07-04 RX ADMIN — ARIPIPRAZOLE 10 MG: 10 TABLET ORAL at 09:04

## 2025-07-04 ASSESSMENT — ACTIVITIES OF DAILY LIVING (ADL)
ADLS_ACUITY_SCORE: 20
ADLS_ACUITY_SCORE: 20
LAUNDRY: WITH SUPERVISION
HYGIENE/GROOMING: INDEPENDENT
ADLS_ACUITY_SCORE: 20
ORAL_HYGIENE: INDEPENDENT
LAUNDRY: WITH SUPERVISION
DRESS: INDEPENDENT
ADLS_ACUITY_SCORE: 20
DRESS: INDEPENDENT
HYGIENE/GROOMING: INDEPENDENT
ADLS_ACUITY_SCORE: 20
ORAL_HYGIENE: INDEPENDENT
ADLS_ACUITY_SCORE: 20

## 2025-07-04 NOTE — PLAN OF CARE
SHIFT NURSING PLAN OF CARE   Problem: Anxiety Signs/Symptoms  Goal: Enhanced Social, Occupational or Functional Skills (Anxiety Signs/Symptoms)  Outcome: Not Progressing     Problem: Psychotic Signs/Symptoms  Goal: Improved Behavioral Control (Psychotic Signs/Symptoms)  Outcome: Progressing   Goal Outcome Evaluation:    Pt has been isolative and withdrawn to her room; guarded and restricted; affect is flat and blunted; appeared downplaying her MH symptoms; denied SI/SIB,AVH, depression and anxiety. Reported poor night sleep but she has been napping on and off during the da per pt. Denied medication side effect; medication compliant; denied issue with appetite and fluid intake; ate dinner; VSS except /79. Watched TV in the lounge; no PRN requested or indicated.

## 2025-07-04 NOTE — CARE PLAN
Rehab Group    Start time: 1715  End time: 1800  Patient time total: 45 minutes    attended full group    #4 attended   Group Type: recreation   Group Topic Covered: activity therapy, coping skills, healthy leisure time, and problem solving     Group Session Detail:  Focus of today's group was on healthy leisure exploration and engagement. Patient actively participated in a group game in order to: improve social skills and decrease isolative behaviors, exercise cognitive skills, improve concentration, and encourage new learning and retention.       Patient Response/Contribution:  positive affect, cooperative with task, organized, attentive, and actively engaged       Patient Detail:  Participated in check-in, didn't note any particular stressors or concerns prior to starting evening group.  Patient presented with a calm, cooperative mood. Patient had some familiarity with the game, though rules seemed slightly different, though that didn't seem to bother her.   Patient was able to engage independently and remain fully attentive and focused on task until conclusion. Pleasant and polite group attendee. No safety or behavioral concerns noted in group         [id number] OT Group (2 or more in attendance)    Patient Active Problem List   Diagnosis    Psychosis (H)

## 2025-07-04 NOTE — PLAN OF CARE
"  Rehab Group    Start time: 1015  End time: 1155  Patient time total: 25 minutes    attended partial group    #6 attended   Group Type: OT Clinic   Group Topic Covered: balanced lifestyle, coping skills, healthy leisure time, mindfulness, and relaxation      Group Session Detail:    Intervention: Pt participated in a OT Clinic group to facilitate coping skills exploration and creative expression through personally meaningful activities, and to encourage utilization of these healthy coping skills to promote overall health and wellness. Group included clinical observation of social, cognitive and kinesthetic performance skills to inform treatment and safe discharge planning.    Cognition:  Goal directed     Mood/Affect: Flat, Pleasant      Plan: Patient encouraged to maintain attendance for continued ongoing support in working towards occupational therapy goals to support overall treatment/care.        Patient Detail:    Patient originally replying \"okay\" when writer invited to morning group. Later on was overheard asking a staff member out in the lounge to go take a shower. Joined group when this staff member informed patient they would have to wait until after group and encouraged her to join. Writer presented options for remaining time, patient requesting to work on holiday theme word search and word scramble for remainder of time in group. Did not socialize during this time with writer or other peers other than short interactions when writer initiated the conversation.       [id number] OT Group (2 or more in attendance)    Patient Active Problem List   Diagnosis    Psychosis (H)      "

## 2025-07-04 NOTE — PLAN OF CARE
Goal Outcome Evaluation:  Problem: Sleep Disturbance  Goal: Adequate Sleep/Rest  Outcome: Not Progressing     Pt received asleep as the shift started, with even and unlabored respirations. No distress noted or reported. No PRN given or required during the shift. At 0400 pt came out to the desk with no pants on, RN walked with pt back to her room and encouraged her to put her pants on. Pt requested for  BP check. BP was 136/85 at the time.     Observed to have slept for 4.75 hours.

## 2025-07-04 NOTE — PLAN OF CARE
Problem: Adult Behavioral Health Plan of Care  Goal: Plan of Care Review  Outcome: Progressing  Flowsheets (Taken 7/4/2025 1049)  Plan of Care Reviewed With: patient  Overall Patient Progress: improving  Patient Agreement with Plan of Care: agrees  Goal: Optimized Coping Skills in Response to Life Stressors  Outcome: Progressing  Flowsheets (Taken 7/4/2025 1049)  Optimized Coping Skills in Response to Life Stressors: making progress toward outcome  Goal: Develops/Participates in Therapeutic [place] to Support Successful Transition  Outcome: Progressing  Flowsheets (Taken 7/4/2025 1049)  Develops/Participates in Therapeutic [place] to Support Successful Transition: making progress toward outcome     Problem: Anxiety Signs/Symptoms  Goal: Improved Mood Symptoms (Anxiety Signs/Symptoms)  Outcome: Progressing  Flowsheets (Taken 7/4/2025 1049)  Mutually Determined Action Steps (Improved Mood Symptoms): adheres to medication regimen     Problem: Psychotic Signs/Symptoms  Goal: Optimal Cognitive Function (Psychotic Signs/Symptoms)  Outcome: Progressing  Flowsheets (Taken 7/4/2025 1049)  Mutually Determined Action Steps (Optimal Cognitive Function): participates in problem resolution   Goal Outcome Evaluation:    Plan of Care Reviewed With: patient Plan of Care Reviewed With: patient    Overall Patient Progress: improvingOverall Patient Progress: improving     Margoth continues to c/o poor sleep-states she feels relaxed when she gets into bed, but is unable to fall asleep-also c/o feeling sleepy post increased dose of Abilify 10 mg this morning-questioning whether med she was given HS was mixed up with AM-although reads package and reviews med when administered-encouraged to take trazodone later time (did not take last night-took only melatonin) as she complained when she took trazodone she was struggling to stay awake for visit with  and then fell asleep during movie- c/o feeling foggy headed- requested RN meet with  her to discuss some things she was confused about- Margoth had gone through educational forms in her admission folder and circled various areas on each forms- for example, on Heart Healthy form was confused why she would have this form because she doesn't have heart disease- then circled common sense reminders for further questions, although did not have specific questions-carefully went through each form, although did not have specific question- satisfied with general explanation of purpose of form- although repeatedly said she was going to nap today, was in and out of room and only briefly fell asleep- visiting with  and mother this afternoon-

## 2025-07-04 NOTE — PLAN OF CARE
"Goal Outcome Evaluation:    Plan of Care Reviewed With: patient      The patient has been minimally visible in the milieu. In the milieu area, the patient remains calm and actively engages in unit activities. State's mood as \"Ok.\" Affect as quiet and withdrawn. The patient visited with her , who brought some clothing for the patient. Denies SI/SIB/HI/ pain and hallucinations. Took PRN Melatonin for sleep.    Problem: Anxiety Signs/Symptoms  Goal: Improved Mood Symptoms (Anxiety Signs/Symptoms)  Outcome: Progressing             "

## 2025-07-05 PROCEDURE — 250N000013 HC RX MED GY IP 250 OP 250 PS 637

## 2025-07-05 PROCEDURE — 124N000002 HC R&B MH UMMC

## 2025-07-05 PROCEDURE — 90853 GROUP PSYCHOTHERAPY: CPT

## 2025-07-05 PROCEDURE — 97150 GROUP THERAPEUTIC PROCEDURES: CPT | Mod: GO

## 2025-07-05 RX ADMIN — TRAZODONE HYDROCHLORIDE 100 MG: 100 TABLET ORAL at 01:13

## 2025-07-05 RX ADMIN — Medication 50 MCG: at 09:04

## 2025-07-05 RX ADMIN — TRAZODONE HYDROCHLORIDE 100 MG: 100 TABLET ORAL at 20:03

## 2025-07-05 RX ADMIN — ARIPIPRAZOLE 10 MG: 10 TABLET ORAL at 09:04

## 2025-07-05 ASSESSMENT — ACTIVITIES OF DAILY LIVING (ADL)
ADLS_ACUITY_SCORE: 20
HYGIENE/GROOMING: INDEPENDENT
ADLS_ACUITY_SCORE: 20
ORAL_HYGIENE: INDEPENDENT
ADLS_ACUITY_SCORE: 20
ADLS_ACUITY_SCORE: 20
LAUNDRY: WITH SUPERVISION
ADLS_ACUITY_SCORE: 20
ORAL_HYGIENE: INDEPENDENT
ADLS_ACUITY_SCORE: 20
HYGIENE/GROOMING: INDEPENDENT
ADLS_ACUITY_SCORE: 20
ADLS_ACUITY_SCORE: 20
DRESS: INDEPENDENT
LAUNDRY: WITH SUPERVISION
DRESS: INDEPENDENT

## 2025-07-05 NOTE — PLAN OF CARE
"  Problem: Adult Behavioral Health Plan of Care  Goal: Plan of Care Review  Outcome: Progressing     Problem: Anxiety Signs/Symptoms  Goal: Improved Mood Symptoms (Anxiety Signs/Symptoms)  Outcome: Progressing     Problem: Sleep Disturbance  Goal: Adequate Sleep/Rest  Outcome: Progressing   Goal Outcome Evaluation:    Shift Summary:    Vital Signs:    /82 (BP Location: Left arm, Patient Position: Sitting)   Pulse 88   Temp 97.9  F (36.6  C) (Temporal)   Resp 16   Ht 1.626 m (5' 4\")   Wt 59.9 kg (132 lb)   SpO2 95%   BMI 22.66 kg/m       Mental Health:    Patient has been intermittently visible in the milieu a of the shift eating meals and watching television.  She also spent time doing word finds in her room.      Patient reports that she slept \"a little bit better\" last night but still is having difficulty getting and staying asleep.  Encouraged Margoth to take prn trazodone about an hour before she wants to sleep.  Patient took the trazodone at 01:13 am today.  Patient reports anxiety 2/10 and denies SI/SIB/HI/AVH.  She does endorse difficulty with concentration and some \"worries,\" but didn't elaborate.  Patient presents as guarded and withdrawn.  Was not observed interacting with peers.  Speech is soft and quiet.  Patient visited with sister and played cards.  Reported visit went well.  Spoke with writer about feeling tired during visit and difficulty focusing, decided to take nap during reflection time.  Patient will try taking medications for sleep earlier in the evening, tonight to see if that's more effective.    Patient remains on a no roommate order.  Margoth has been compliant with medications and reports feeling tired in the afternoon and believes it is from her medication.   Will continue plan of care and assist as needed.                              "

## 2025-07-05 NOTE — CARE PLAN
Rehab Group    Start time: 1015  End time: 1100  Patient time total: 35 minutes    attended partial group    #2 attended   Group Type: occupational therapy   Group Topic Covered: balanced lifestyle, cognitive activities, coping skills, healthy leisure time, substance use/recovery , self-esteem, and social skills       Group Session Detail:  Bridge to Self-Confidence processing activity for tracking, concentration, sharing thoughts/feelings, building insight and self-awareness, coping with stress, mood stabilization, reality-based activity, follow through, fostering hope for a sustainable recovery, and expanding social skills.       Patient Response/Contribution:  actively engaged, blunted/depressed affect, and intermittently sleepy       Patient Detail:  Pt joined group, though closed her eyes when it was not her turn.  Pt seemed to be sleepy with a depressed and blunted affect.  Though pt made an effort to participate when it was her turn to do so.  Pt shared openly with the group, though she does seem to minimize her illness.  Pt does show some self-awareness with select questions.  Pt was pleasant and polite throughout.          [id number] OT Group (2 or more in attendance)      Patient Active Problem List   Diagnosis    Psychosis (H)

## 2025-07-05 NOTE — PLAN OF CARE
SHIFT NURSING PLAN OF CARE   Problem: Adult Behavioral Health Plan of Care  Goal: Develops/Participates in Therapeutic [place] to Support Successful Transition  Outcome: Progressing  Flowsheets (Taken 7/5/2025 1803)  Develops/Participates in Therapeutic [place] to Support Successful Transition: making progress toward outcome     Problem: Anxiety Signs/Symptoms  Goal: Improved Mood Symptoms (Anxiety Signs/Symptoms)  Outcome: Progressing  Flowsheets (Taken 7/5/2025 1803)  Mutually Determined Action Steps (Improved Mood Symptoms):   shares insight re: need for meds   adheres to medication regimen   Goal Outcome Evaluation:    Pt has been isolative and withdrawn; social with selective peers when out in the milieu; affect is guarded and restricted; attended gropu; had a visit from her  and the visit went well; watched TV in the lounge after dinner; offered PRN medication from sleep from available alternatives and received PRN trazodone per request. Denied SI/SIB,AVH, depression and anxiety. VSS. Hygiene is adequate; calm and pleasant.

## 2025-07-05 NOTE — PLAN OF CARE
Group Attendance:  attended full group    Time session began: 1700   Time session ended: 1745  Patient's total time in group: 45    Total # Attendees   5   Group Type psychotherapeutic and psychoeducational     Group Topic Covered emotional regulation, insight improvement, symptom management, healthy coping skills, Triggers and warning sighs, and relaxation and grounding techniques/coping skills     Group Session Detail Group members checked in with how they are feeling and verbal processing as needed. Group members discussed the different aspects of a safety plan and filled out a copy of their own plan. Writer discussed the function of the plan and how they can use it both while in the hospital and when they leave.      Patient's response to the group topic/interactions:  cooperative with task, organized, socially appropriate, listened actively, expressed understanding of topic, attentive, and actively engaged     Patient Details: Pt attended group, worked on her safety plan, asked questions.           [id number] - Group psychotherapy - 1 Session  Patient Active Problem List   Diagnosis    Psychosis (H)

## 2025-07-05 NOTE — PLAN OF CARE
Goal Outcome Evaluation:  Problem: Sleep Disturbance  Goal: Adequate Sleep/Rest  Outcome: Progressing     Pt appeared asleep as the shift stated, breathing quietly in bed. At 0100 pt came out to the desk requested for prn medication to help her sleep. Trazodone 100 Mg administered at 0113, 10 minutes later pt requested for her vitals be taken. /81, P 91 96%. Pt appeared to have slept for 6.5 hours.

## 2025-07-06 PROCEDURE — 250N000013 HC RX MED GY IP 250 OP 250 PS 637

## 2025-07-06 PROCEDURE — 124N000002 HC R&B MH UMMC

## 2025-07-06 RX ADMIN — Medication 50 MCG: at 09:02

## 2025-07-06 RX ADMIN — ARIPIPRAZOLE 10 MG: 10 TABLET ORAL at 09:02

## 2025-07-06 RX ADMIN — TRAZODONE HYDROCHLORIDE 100 MG: 100 TABLET ORAL at 20:08

## 2025-07-06 ASSESSMENT — ACTIVITIES OF DAILY LIVING (ADL)
ADLS_ACUITY_SCORE: 20
ORAL_HYGIENE: INDEPENDENT
ORAL_HYGIENE: INDEPENDENT
DRESS: INDEPENDENT
ADLS_ACUITY_SCORE: 20
ADLS_ACUITY_SCORE: 20
HYGIENE/GROOMING: INDEPENDENT
ADLS_ACUITY_SCORE: 20
HYGIENE/GROOMING: INDEPENDENT
DRESS: INDEPENDENT
ADLS_ACUITY_SCORE: 20
LAUNDRY: WITH SUPERVISION
ADLS_ACUITY_SCORE: 20
LAUNDRY: WITH SUPERVISION

## 2025-07-06 NOTE — PLAN OF CARE
SHIFT NURSING PLAN OF CARE   Problem: Anxiety Signs/Symptoms  Goal: Improved Mood Symptoms (Anxiety Signs/Symptoms)  Outcome: Progressing  Flowsheets (Taken 7/6/2025 1716)  Mutually Determined Action Steps (Improved Mood Symptoms): adheres to medication regimen     Problem: Psychotic Signs/Symptoms  Goal: Improved Psychomotor Symptoms (Psychotic Signs/Symptoms)  Outcome: Progressing   Goal Outcome Evaluation:    Plan of Care Reviewed With: patient      Pt has been visible in the milieu; spent the shift watching TV in the lounge and resting in bed; denied AVH,depression, anxiety, SI/SIB, sadness and hopelessness; socially isolative and kept to herself; denied issue with appetite and fluid intake. VSS. Requested and received PRN trazodone for sleep.

## 2025-07-06 NOTE — PLAN OF CARE
Goal Outcome Evaluation:  Problem: Sleep Disturbance  Goal: Adequate Sleep/Rest  Outcome: Not Progressing     Pt received asleep at the start of the shift, respirations appeared even with no distress noted.     Appeared to have slept for 4 hours, based on q15 minutes safety checks.

## 2025-07-06 NOTE — PLAN OF CARE
"  Problem: Adult Behavioral Health Plan of Care  Goal: Plan of Care Review  Recent Flowsheet Documentation  Taken 7/6/2025 1030 by Brenda Ordonez RN  Patient Agreement with Plan of Care: agrees  7/6/2025 1028 by Brenda Ordonez RN  Outcome: Progressing     Problem: Anxiety Signs/Symptoms  Goal: Improved Mood Symptoms (Anxiety Signs/Symptoms)  Outcome: Progressing   Goal Outcome Evaluation:    Plan of Care Reviewed With: patient      Shift Summary:    Vital Signs:  /77 (BP Location: Right arm, Patient Position: Sitting, Cuff Size: Adult Regular)   Pulse 87   Temp 98.2  F (36.8  C) (Temporal)   Resp 16   Ht 1.626 m (5' 4\")   Wt 60.5 kg (133 lb 4.8 oz)   SpO2 96%   BMI 22.88 kg/m         Mental Health:    Patient presents as sad and depressed.   Reports she slept better last night even though it was reported she only slept 4 hours.  Last night, patient took trazodone 1 hour prior to bed and felt this was helpful.  Patient continues to report daytime sleepiness \"due to the Abilify.\"  Spoke with Dr. Thayer about changing Abilfy to evening dose but wanted to wait until primary team could evaluate on Monday.    Patient stating she would like to go home and questioning why she needs to take Abilify and if it is helping.  Writer provided education and encouraged patient to take medication and talk with the doctors on Monday.    Margoth reported to writer, the reason for admission was that her \"computer was being hacked and I was going to be responsible for payroll and I haven't done that for a long time and didn't want that responsibility.  Patient continues to deny SI/SIB/HI/AVH along with anxiety and depression.  Received a visit from sister and together they played cards and colored.      Patient has been visible in the milieu a majority of the shift eating meals and watching television.  Appears withdrawn to self and wasn't observed interacting with peers.  Did interact with staff for needs and when " approached.     Patient remains on a no roommate order.  Margoth has been compliant with medications and has not requested or received any prn medications.  Will continue plan of care and assist as needed.

## 2025-07-07 PROCEDURE — 250N000013 HC RX MED GY IP 250 OP 250 PS 637

## 2025-07-07 PROCEDURE — 250N000013 HC RX MED GY IP 250 OP 250 PS 637: Performed by: PSYCHIATRY & NEUROLOGY

## 2025-07-07 PROCEDURE — 99232 SBSQ HOSP IP/OBS MODERATE 35: CPT | Mod: GC | Performed by: PSYCHIATRY & NEUROLOGY

## 2025-07-07 PROCEDURE — 124N000002 HC R&B MH UMMC

## 2025-07-07 PROCEDURE — 97150 GROUP THERAPEUTIC PROCEDURES: CPT | Mod: GO

## 2025-07-07 RX ORDER — TRAZODONE HYDROCHLORIDE 50 MG/1
50 TABLET ORAL
Status: DISCONTINUED | OUTPATIENT
Start: 2025-07-07 | End: 2025-07-09 | Stop reason: HOSPADM

## 2025-07-07 RX ADMIN — Medication 50 MCG: at 07:57

## 2025-07-07 RX ADMIN — ARIPIPRAZOLE 10 MG: 10 TABLET ORAL at 07:57

## 2025-07-07 RX ADMIN — ACETAMINOPHEN 650 MG: 325 TABLET ORAL at 01:50

## 2025-07-07 RX ADMIN — TRAZODONE HYDROCHLORIDE 50 MG: 50 TABLET ORAL at 20:25

## 2025-07-07 ASSESSMENT — ACTIVITIES OF DAILY LIVING (ADL)
ADLS_ACUITY_SCORE: 20
DRESS: INDEPENDENT
ADLS_ACUITY_SCORE: 20
ADLS_ACUITY_SCORE: 20
LAUNDRY: WITH SUPERVISION
ADLS_ACUITY_SCORE: 20
ORAL_HYGIENE: INDEPENDENT
ADLS_ACUITY_SCORE: 20
HYGIENE/GROOMING: INDEPENDENT
LAUNDRY: WITH SUPERVISION
ADLS_ACUITY_SCORE: 20
HYGIENE/GROOMING: INDEPENDENT
ADLS_ACUITY_SCORE: 20
DRESS: INDEPENDENT
ORAL_HYGIENE: INDEPENDENT
ADLS_ACUITY_SCORE: 20

## 2025-07-07 NOTE — PLAN OF CARE
Problem: Adult Behavioral Health Plan of Care  Goal: Plan of Care Review  Outcome: Progressing  Flowsheets (Taken 7/7/2025 1311)  Plan of Care Reviewed With: patient  Overall Patient Progress: improving  Patient Agreement with Plan of Care: agrees  Goal: Develops/Participates in Therapeutic [place] to Support Successful Transition  Outcome: Progressing  Flowsheets (Taken 7/7/2025 1311)  Develops/Participates in Therapeutic [place] to Support Successful Transition: making progress toward outcome     Problem: Psychotic Signs/Symptoms  Goal: Optimal Cognitive Function (Psychotic Signs/Symptoms)  Outcome: Progressing  Flowsheets (Taken 7/7/2025 1311)  Mutually Determined Action Steps (Optimal Cognitive Function): participates in problem resolution  Goal: Increased Participation and Engagement (Psychotic Signs/Symptoms)  Outcome: Progressing  Flowsheets (Taken 7/7/2025 1311)  Mutually Determined Action Steps (Increased Participation and Engagement): verbalizes personal treatment goal   Goal Outcome Evaluation:      Plan of Care Reviewed With: patient    Overall Patient Progress: improvingOverall Patient Progress: improving     Margoth this AM expressed relief her sleep has improved, but feels degree of daytime drowsiness is excessive--very drowsy post lunch-eyes closing while in conversation-asking why she is so sleepy in the afternoon-today acknowledged sleepiness in AM which was not present before taking Abilify- encouraged to take 50 mg of trazodone initially and request additional 50 mg if remains awake-expressed concern she would not be able to tell how much time has passed before requesting second dose- encouraged to go to sleep with intent to sleep with initial dose and only concern self with second dose if she is awake in bed and unable to sleep- Margoth reluctantly agreed--Margoth denies unusual anxiety, fears or paranoia-attended AM OT grp-freq sitting in lounge during free time, often watching television-Margoth spoke  with  on phone-

## 2025-07-07 NOTE — PLAN OF CARE
07/07/25 1419   Individualization/Patient Specific Goals   Patient Personal Strengths family/social support;independent living skills   Patient Vulnerabilities family/relationship conflict;lacks insight into illness   Interprofessional Rounds   Participants CTC;nursing;psychiatrist;other (see comments)   Behavioral Team Discussion   Participants Dr. Juan Villegas; Jennifer Purivs RN; Lesa Shelby [facility], [facility]   Progress Pt severity of symptoms have yet to decrease. Staff note pt has had increased confusion.   Anticipated length of stay 10-15 days   Continued Stay Criteria/Rationale Ongoing symptoms, medication management   Medical/Physical See H&P   Plan Psychiatric assessment/Medication management. Therapeutic Milieu. Individual care planning and after care planning. Patient to participate in unit groups and activities. Individual and group support on unit.   Rationale for change in precautions or plan N/A   Safety Plan per unit protocol   Anticipated Discharge Disposition home with family       PRECAUTIONS AND SAFETY    Behavioral Orders   Procedures    Code 1 - Restrict to Unit    Routine Programming     As clinically indicated    Status 15     Every 15 minutes.       Safety  Safety WDL: WDL  Patient Location: patient room, own  Observed Behavior: sleeping  Observed Behavior (Comment): sleeping  Safety Measures: safety rounds completed  Diversional Activity: television  De-Escalation Techniques: appropriate behavior reinforced  Suicidality: Status 15, Minimal furniture in room

## 2025-07-07 NOTE — PLAN OF CARE
Goal Outcome Evaluation:  Problem: Sleep Disturbance  Goal: Adequate Sleep/Rest  Outcome: Progressing     Pt received asleep at the start of the shift, respirations appeared even with no distress noted. At 0150 pt came out to the desk requested and received PRN Tylenol 650 Mg for headache.    Appeared to have slept for 6 hours, based on q15 minutes safety checks.

## 2025-07-07 NOTE — PLAN OF CARE
Rehab Group    Start time: 1020  End time: 1205  Patient time total: 80 minutes    attended full group    #6 attended   Group Type: occupational therapy   Group Topic Covered: coping skills     Group Session Detail:  OT clinic     Patient Response/Contribution:  cooperative with task, organized, attentive, and actively engaged     Patient Detail:    Pt actively participated in occupational therapy clinic to facilitate coping skill exploration, creative expression within personally meaningful activities, and clinical observation of social, cognitive, and kinesthetic performance skills. Pt response: Independent to initiate, gather materials, sequence, and adjust to workspace demands as needed. Demonstrated good focus, planning, and attention to detail for selected novel creative expression task. Able to ask for assistance as needed, and occasionally socialized with peers and staff, though spent a majority of this group quietly focused on her project. Calm, pleasant, and polite on approach.        [id number] OT Group (2 or more in attendance)      Patient Active Problem List   Diagnosis    Psychosis (H)

## 2025-07-07 NOTE — PLAN OF CARE
BEH IP Unit Acuity Rating Score (UARS)  Patient is given one point for every criteria they meet.    CRITERIA SCORING   On a 72 hour hold, court hold, committed, stay of commitment, or revocation. 0    Patient LOS on BEH unit exceeds 20 days. 0  LOS: 8   Patient under guardianship, 55+, otherwise medically complex, or under age 11. 1   Suicide ideation without relief of precipitating factors. 0   Current plan for suicide. 0   Current plan for homicide. 0   Imminent risk or actual attempt to seriously harm another without relief of factors precipitating the attempt. 1   Severe dysfunction in daily living (ex: complete neglect for self care, extreme disruption in vegetative function, extreme deterioration in social interactions). 1   Recent (last 7 days) or current physical aggression in the ED or on unit. 0   Restraints or seclusion episode in past 72 hours. 0   Recent (last 7 days) or current verbal aggression, agitation, yelling, etc., while in the ED or unit. 0   Active psychosis. 1   Need for constant or near constant redirection (from leaving, from others, etc).  1   Intrusive or disruptive behaviors. 1   Patient requires 3 or more hours of individualized nursing care per 8-hour shift (i.e. for ADLs, meds, therapeutic interventions). 0   TOTAL 5
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
## 2025-07-07 NOTE — PLAN OF CARE
Team Note Due:  Monday    Assessment/Intervention/Current Symtoms and Care Coordination:  Chart review and met with team, discussed pt progress, symptomology, and response to treatment.  Discussed the discharge plan and any potential impediments to discharge. Per team pt slept 6 hours. Over the weekend pt was withdrawn and isolated to her room. PT presents with low energy and increased confusion. Pt has been complaining of tiredness. Nursing staff note pt appeared to be downplaying her mental health symptoms denying all symptoms and presenting as flat and dysphoric over the weekend.     Pt was not comfortable with signing SHAHBAZ for [facility] as she is uncomfortable with the line stating that we can not prevent release for third parties after documents are released.      Discharge Plan or Goal:  Home with outpatient services     Barriers to Discharge:  Ongoing symptoms  Medication management       Referral Status:  None currently      Legal Status:  Voluntary       Contacts (include SHAHBAZ status):  Primary Care Provider:  Name/Clinic: [facility]              Number: [phone]  No SHAHBAZ- Declined 7/7/25     Other contact information (family, friends, SO) and SHAHBAZ status:      Dev, , [phone], SHAHBAZ signed 6/30  Michaela Amezquita, sister, [phone], SHAHBAZ Signed      Upcoming Meetings and Dates/Important Information and next steps:  None currently

## 2025-07-08 LAB
ERYTHROCYTE [SEDIMENTATION RATE] IN BLOOD BY WESTERGREN METHOD: 15 MM/HR (ref 0–30)
HIV 1+2 AB+HIV1 P24 AG SERPL QL IA: NONREACTIVE
T PALLIDUM AB SER QL: NONREACTIVE

## 2025-07-08 PROCEDURE — 86038 ANTINUCLEAR ANTIBODIES: CPT | Performed by: PSYCHIATRY & NEUROLOGY

## 2025-07-08 PROCEDURE — 87476 LYME DIS DNA AMP PROBE: CPT | Performed by: PSYCHIATRY & NEUROLOGY

## 2025-07-08 PROCEDURE — 36415 COLL VENOUS BLD VENIPUNCTURE: CPT | Performed by: PSYCHIATRY & NEUROLOGY

## 2025-07-08 PROCEDURE — 250N000013 HC RX MED GY IP 250 OP 250 PS 637

## 2025-07-08 PROCEDURE — 85652 RBC SED RATE AUTOMATED: CPT | Performed by: PSYCHIATRY & NEUROLOGY

## 2025-07-08 PROCEDURE — 87389 HIV-1 AG W/HIV-1&-2 AB AG IA: CPT | Performed by: PSYCHIATRY & NEUROLOGY

## 2025-07-08 PROCEDURE — H2032 ACTIVITY THERAPY, PER 15 MIN: HCPCS

## 2025-07-08 PROCEDURE — 97150 GROUP THERAPEUTIC PROCEDURES: CPT | Mod: GO

## 2025-07-08 PROCEDURE — 82390 ASSAY OF CERULOPLASMIN: CPT | Performed by: PSYCHIATRY & NEUROLOGY

## 2025-07-08 PROCEDURE — 124N000002 HC R&B MH UMMC

## 2025-07-08 PROCEDURE — 99232 SBSQ HOSP IP/OBS MODERATE 35: CPT | Performed by: PSYCHIATRY & NEUROLOGY

## 2025-07-08 PROCEDURE — 86780 TREPONEMA PALLIDUM: CPT | Performed by: PSYCHIATRY & NEUROLOGY

## 2025-07-08 RX ORDER — LORAZEPAM 0.5 MG/1
0.25 TABLET ORAL ONCE
Status: COMPLETED | OUTPATIENT
Start: 2025-07-09 | End: 2025-07-09

## 2025-07-08 RX ORDER — LORAZEPAM 0.5 MG/1
0.5 TABLET ORAL ONCE
Status: DISCONTINUED | OUTPATIENT
Start: 2025-07-08 | End: 2025-07-08

## 2025-07-08 RX ADMIN — Medication 50 MCG: at 08:45

## 2025-07-08 RX ADMIN — ARIPIPRAZOLE 10 MG: 10 TABLET ORAL at 08:45

## 2025-07-08 ASSESSMENT — ACTIVITIES OF DAILY LIVING (ADL)
HYGIENE/GROOMING: INDEPENDENT
ADLS_ACUITY_SCORE: 20
DRESS: SCRUBS (BEHAVIORAL HEALTH);INDEPENDENT
LAUNDRY: WITH SUPERVISION
ADLS_ACUITY_SCORE: 20
DRESS: INDEPENDENT
ADLS_ACUITY_SCORE: 20
HYGIENE/GROOMING: INDEPENDENT
ADLS_ACUITY_SCORE: 20
ORAL_HYGIENE: INDEPENDENT
ADLS_ACUITY_SCORE: 20
ORAL_HYGIENE: INDEPENDENT
ADLS_ACUITY_SCORE: 20
LAUNDRY: WITH SUPERVISION
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
## 2025-07-08 NOTE — PLAN OF CARE
Team Note Due:  Monday    Assessment/Intervention/Current Symtoms and Care Coordination:  Chart review and met with team, discussed pt progress, symptomology, and response to treatment. Discussed the discharge plan and any potential impediments to discharge.     -Sent a message to outpatient intake team to have Margoth scheduled with outpatient psychiatry. Details of intake appointment on AVS.        Discharge Plan or Goal:  Home with outpatient services     Barriers to Discharge:  Ongoing symptoms  Medication management       Referral Status:  None currently      Legal Status:  Voluntary       Contacts (include SHAHBAZ status):  Primary Care Provider:  Name/Clinic: [facility]              Number: [phone]  No SHAHBAZ- Declined 7/7/25     Other contact information (family, friends, SO) and SHAHBAZ status:      Dev, , [phone], SHAHBAZ signed 6/30  Michaela Amezquita, sister, [phone], SHAHBAZ Signed      Upcoming Meetings and Dates/Important Information and next steps:  None currently

## 2025-07-08 NOTE — DISCHARGE INSTRUCTIONS
Behavioral Discharge Planning and Instructions    Summary: You were admitted on 6/29/2025  due to Psychotic Symptomology.  You were treated by Juan Engel MD and discharged on 07/09/2025 from [facility] to Home    Main Diagnosis: Psychosis, unspecified       Health Care Follow-up:     Appointment: Psychiatry   Date/time:  Thursday August 7th, 2025 @ 10:00 AM Virtual  Provider:  Marija Castillo APRN CNP  Address: [facility]  Phone: [phone]  Note:    Visit link will be sent to [phone].    Psychiatry/Medication Management:  [facility], Dr. Robledo   [facility], [address] [place], [place] [address]e. S, [address], [address], [place], [place], [address]  P: [phone]  Intake Appointment: 8/28/25 at 9:10am         Patient Navigation Hub:   [facility] work to be your point-of-contact for trustworthy and compassionate care from Inpatient services to [facility] Programmatic Care. We will provide resources and communication to help guide you into programmatic care. Ultimately, our goal is to be the one-stop-shop of communication, coordination, and support for your journey to programmatic care.    Phone: [phone]    Information will be faxed to your outpatient providers to ensure a healthy continuity of care for you.     Attend all scheduled appointments with your outpatient providers. Call at least 24 hours in advance if you need to reschedule an appointment to ensure continued access to your outpatient providers.     Major Treatments, Procedures and Findings:  You were provided with: a psychiatric assessment, medication evaluation and/or management, group therapy, individual therapy, and milieu management    Symptoms to Report: increased confusion, losing more sleep, or mood getting worse    Early warning signs can include: increased depression or anxiety sleep disturbances increased unusual thinking, such as paranoia or hearing  voices    Safety and Wellness:  Take all medicines as directed.  Make no changes unless your doctor suggests them.      Follow treatment recommendations.  Refrain from alcohol and non-prescribed drugs.  If there is a concern for safety, call 911.    Resources:   Mental Health Crisis Resources  Throughout [place]: call **CRISIS (**081898)  Crisis Text Line: is available for free, 24/7 by texting [place] to 507553  Suicide Awareness Voices of Education (SAVE) (www.save.org): 879-368-WKZZ (7045)  The National Suicide Prevention Lifeline is now: 988 Suicide and Crisis Lifeline. Call 988 anytime.  National [place] on Mental Illness (www.[place].adolfo.org): 806.132.5928 or 245-172-4487.  Hqmn1ymut: text the word LIFE to 98498 for immediate support and crisis intervention  Mental Health Consumer/Survivor Network of [place] (www.mhcsn.net): 956.788.7182 or 369-960-7075  Mental Health Association of [place] (www.mentalhealth.org): 369.795.9814 or 974-920-5851  Peer Support Connection [place] Warmline (PSC) 1-605.988.5215 Available from 5pm - 9am (7 days a week/365 days a year)  [facility] 1-449.127.4092      General Medication Instructions:   See your medication sheet(s) for instructions.   Take all medicines as directed.  Make no changes unless your doctor suggests them.   Go to all your doctor visits.  Be sure to have all your required lab tests. This way, your medicines can be refilled on time.  Do not use any drugs not prescribed by your doctor.  Avoid alcohol.    Advance Directives:   Scanned document on file with [facility]? No scanned doc  Is document scanned? Pt states no documents  Honoring Choices Your Rights Handout: Informed and given  Was more information offered? Pt declined    The Treatment team has appreciated the opportunity to work with you. If you have any questions or concerns about your recent admission, you can contact the unit which can receive your call 24 hours a day, 7 days a week. They will be able to get in touch with  a Provider if needed. The unit number is [phone] .

## 2025-07-08 NOTE — PLAN OF CARE
"  Rehab Group    Start time: 1330  End time: 1420  Patient time total: 45 minutes    attended full group     #3 attended   Group Type: occupational therapy   Group Topic Covered: balanced lifestyle, Physical activity, and social skills     Group Session Detail:  Discussion questions & gentle movement     Patient Response/Contribution:  cooperative with task, listened actively, attentive, and actively engaged     Patient Detail:    Pt actively participated in a structured occupational therapy group focusing on gentle self-reflection, social connection, and self-regulating physical movement. Pt actively participated in 100% of the physical exercises/stretches, and was respectful when listening and responding to peers. Responses to prompts were thoughtful and appropriate to topic. When prompted to identify a new hobby she is interested in engaging in more, she identified \"swimming.\" Affect appeared restricted, though brightened slightly in interactions.       [id number] OT Group (2 or more in attendance)      Patient Active Problem List   Diagnosis    Psychosis (H)             "

## 2025-07-08 NOTE — PLAN OF CARE
Care Coordinator Note(s):    Care Request(s):   Psychiatry   Preferences: Time Frame: 3 Weeks, Any Appointment Type (In Person, Virtual, Telephone)  Notes: Pt will discharge tomorrow. She has an intake appointment with [facility] women[facility] psychiatry, but not until 8/28. She will need something for between.         Care Outcome(s):    CC Progress Note(s)/ Documentation:     Transition Clinic Referral Submitted: 7/9 @ 900  Appt scheduled by Estela Barrios on 7/15, writer asked for appt to be rescheduled to first week of August.      Appointment: Psychiatry   Date/time:  Thursday August 7th, 2025 @ 10:00 AM Virtual  Provider:  Marija Castillo APRN CNP  Address: [facility]  Phone: [phone]  Note:    Visit link will be sent to [phone].      -Lu Jean  Adult Behavioral Health Care Coordinator

## 2025-07-08 NOTE — PLAN OF CARE
Problem: Anxiety Signs/Symptoms  Goal: Improved Mood Symptoms (Anxiety Signs/Symptoms)  Outcome: Progressing  Note: Pt reports continued feeling foggy and drowsy during the day as a result of her Abilify. States that she is willing to see how her body adjusts to it and report it to the team. Spends time out in the lounge watching tv. Did have visitors this evening. Affect is blunted and flat. Mood has been calm. She is med compliant. Denies SI and SIB. Amalia Johnston RN     Goal Outcome Evaluation:

## 2025-07-08 NOTE — PLAN OF CARE
"Problem: Adult Behavioral Health Plan of Care  Goal: Plan of Care Review  Outcome: Progressing  Flowsheets (Taken 7/8/2025 1637)  Plan of Care Reviewed With: patient  Overall Patient Progress: improving  Patient Agreement with Plan of Care: agrees     Problem: Psychotic Signs/Symptoms  Goal: Improved Behavioral Control (Psychotic Signs/Symptoms)  Outcome: Progressing  Flowsheets (Taken 7/8/2025 1637)  Mutually Determined Action Steps (Improved Behavioral Control): verbalizes personal treatment goal     Problem: Psychotic Signs/Symptoms  Goal: Optimal Cognitive Function (Psychotic Signs/Symptoms)  Outcome: Progressing  Flowsheets (Taken 7/8/2025 1637)  Mutually Determined Action Steps (Optimal Cognitive Function): participates in problem resolution     Problem: Psychotic Signs/Symptoms  Goal: Increased Participation and Engagement (Psychotic Signs/Symptoms)  Outcome: Progressing  Flowsheets (Taken 7/8/2025 1637)  Mutually Determined Action Steps (Increased Participation and Engagement): verbalizes personal treatment goal   Goal Outcome Evaluation:      Plan of Care Reviewed With: patient    Overall Patient Progress: improvingOverall Patient Progress: improving     Margoth active on unit most of day-attended grps-continues fuzzy thinking-when asked if she has been experiencing depression she responded, \"I'm not sure. I don't know. Well maybe because I'm concerned about taking that medication.\"- referring to Abilify- Margoth reports feeling less drowsy today, but states she believes this is due to OT exercise grp rather than reduction in trazodone- was observed falling asleep while sitting in lounge this afternoon           "

## 2025-07-08 NOTE — PLAN OF CARE
Rehab Group    Start time: 1015  End time: 1155  Patient time total: 95 minutes    attended full group    #5 attended   Group Type: occupational therapy   Group Topic Covered: coping skills     Group Session Detail:  OT clinic     Patient Response/Contribution:  cooperative with task, attentive, and actively engaged     Patient Detail:    Pt actively participated in occupational therapy clinic to facilitate coping skill exploration, creative expression within personally meaningful activities, and clinical observation of social, cognitive, and kinesthetic performance skills. Pt response: Independent to initiate, gather materials, sequence, and adjust to workspace demands as needed. Demonstrated good attention to task and attention to detail for selected novel, goal-directed task. Benefited from repeated task directions for her novel task, then was able to work independently for the remainder of group. Able to ask for assistance as needed, and appeared comfortable interacting with peers and staff when conversation was initiated with her, otherwise spent a majority of group quietly focused on her tasks. Calm, pleasant, and engaged.      [id number] OT Group (2 or more in attendance)      Patient Active Problem List   Diagnosis    Psychosis (H)

## 2025-07-08 NOTE — PLAN OF CARE
BEH IP Unit Acuity Rating Score (UARS)  Patient is given one point for every criteria they meet.    CRITERIA SCORING   On a 72 hour hold, court hold, committed, stay of commitment, or revocation. 0    Patient LOS on BEH unit exceeds 20 days. 0  LOS: 9   Patient under guardianship, 55+, otherwise medically complex, or under age 11. 1   Suicide ideation without relief of precipitating factors. 0   Current plan for suicide. 0   Current plan for homicide. 0   Imminent risk or actual attempt to seriously harm another without relief of factors precipitating the attempt. 1   Severe dysfunction in daily living (ex: complete neglect for self care, extreme disruption in vegetative function, extreme deterioration in social interactions). 1   Recent (last 7 days) or current physical aggression in the ED or on unit. 0   Restraints or seclusion episode in past 72 hours. 0   Recent (last 7 days) or current verbal aggression, agitation, yelling, etc., while in the ED or unit. 0   Active psychosis. 1   Need for constant or near constant redirection (from leaving, from others, etc).  1   Intrusive or disruptive behaviors. 1   Patient requires 3 or more hours of individualized nursing care per 8-hour shift (i.e. for ADLs, meds, therapeutic interventions). 0   TOTAL 5

## 2025-07-08 NOTE — PLAN OF CARE
"  Problem: Sleep Disturbance  Goal: Adequate Sleep/Rest  Outcome: Progressing   Goal Outcome Evaluation:      The patient had an uneventful night, and there were no reported concerns about her mental health. However, she visited the Nurses' Station at midnight, stating, \"I woke up this morning with my eyes yellow.\" Upon examination, her eyes appeared normal. The author successfully redirected her focus. She continues to be monitored every 15 minutes, and no safety issues have been noted. She slept for 6.25 hours.             "

## 2025-07-09 ENCOUNTER — APPOINTMENT (OUTPATIENT)
Dept: MRI IMAGING | Facility: CLINIC | Age: 55
End: 2025-07-09
Attending: PSYCHIATRY & NEUROLOGY
Payer: COMMERCIAL

## 2025-07-09 ENCOUNTER — TELEPHONE (OUTPATIENT)
Dept: BEHAVIORAL HEALTH | Facility: CLINIC | Age: 55
End: 2025-07-09

## 2025-07-09 VITALS
SYSTOLIC BLOOD PRESSURE: 125 MMHG | HEIGHT: 64 IN | TEMPERATURE: 98.5 F | OXYGEN SATURATION: 94 % | WEIGHT: 133.3 LBS | HEART RATE: 108 BPM | RESPIRATION RATE: 16 BRPM | DIASTOLIC BLOOD PRESSURE: 75 MMHG | BODY MASS INDEX: 22.76 KG/M2

## 2025-07-09 PROBLEM — F41.9 ANXIETY: Status: ACTIVE | Noted: 2025-07-09

## 2025-07-09 PROBLEM — G47.00 INSOMNIA: Status: ACTIVE | Noted: 2025-07-09

## 2025-07-09 PROCEDURE — 255N000002 HC RX 255 OP 636: Performed by: PSYCHIATRY & NEUROLOGY

## 2025-07-09 PROCEDURE — 250N000013 HC RX MED GY IP 250 OP 250 PS 637

## 2025-07-09 PROCEDURE — A9585 GADOBUTROL INJECTION: HCPCS | Performed by: PSYCHIATRY & NEUROLOGY

## 2025-07-09 PROCEDURE — 99239 HOSP IP/OBS DSCHRG MGMT >30: CPT | Mod: GC | Performed by: PSYCHIATRY & NEUROLOGY

## 2025-07-09 PROCEDURE — 70553 MRI BRAIN STEM W/O & W/DYE: CPT

## 2025-07-09 PROCEDURE — 70553 MRI BRAIN STEM W/O & W/DYE: CPT | Mod: 26 | Performed by: RADIOLOGY

## 2025-07-09 PROCEDURE — 97150 GROUP THERAPEUTIC PROCEDURES: CPT | Mod: GO

## 2025-07-09 RX ORDER — HYDROXYZINE HYDROCHLORIDE 25 MG/1
25 TABLET, FILM COATED ORAL EVERY 8 HOURS PRN
Qty: 30 TABLET | Refills: 0 | Status: SHIPPED | OUTPATIENT
Start: 2025-07-09

## 2025-07-09 RX ORDER — TRAZODONE HYDROCHLORIDE 50 MG/1
50 TABLET ORAL
Qty: 30 TABLET | Refills: 0 | Status: SHIPPED | OUTPATIENT
Start: 2025-07-09

## 2025-07-09 RX ORDER — GADOBUTROL 604.72 MG/ML
6 INJECTION INTRAVENOUS ONCE
Status: COMPLETED | OUTPATIENT
Start: 2025-07-09 | End: 2025-07-09

## 2025-07-09 RX ORDER — ARIPIPRAZOLE 10 MG/1
10 TABLET ORAL DAILY
Qty: 30 TABLET | Refills: 0 | Status: SHIPPED | OUTPATIENT
Start: 2025-07-10

## 2025-07-09 RX ADMIN — Medication 50 MCG: at 08:51

## 2025-07-09 RX ADMIN — ARIPIPRAZOLE 10 MG: 10 TABLET ORAL at 08:51

## 2025-07-09 RX ADMIN — GADOBUTROL 6 ML: 604.72 INJECTION INTRAVENOUS at 01:16

## 2025-07-09 RX ADMIN — Medication 0.25 MG: at 00:25

## 2025-07-09 ASSESSMENT — ACTIVITIES OF DAILY LIVING (ADL)
ADLS_ACUITY_SCORE: 20
LAUNDRY: WITH SUPERVISION
ADLS_ACUITY_SCORE: 20
ADLS_ACUITY_SCORE: 20
HYGIENE/GROOMING: INDEPENDENT
ADLS_ACUITY_SCORE: 20
ADLS_ACUITY_SCORE: 20
DRESS: INDEPENDENT
ORAL_HYGIENE: INDEPENDENT

## 2025-07-09 NOTE — PLAN OF CARE
Rehab Group    Start time: 1115  End time: 1210  Patient time total: 55 minutes    attended full group    #2 attended   Group Type: occupational therapy   Group Topic Covered: cognitive activities, healthy leisure time, and social skills     Group Session Detail:  Cognitive group game     Patient Response/Contribution:  cooperative with task, attentive, and actively engaged     Patient Detail:    Pt actively participated in a structured occupational therapy group with a focus on a cognitive activity ([place]) to facilitate attention, sequencing, new learning, categorization, follow-through, reality orientation, structured social engagement, leisure exploration, and coping with symptoms. Demonstrated good attention to task and efficient recall. Affect appeared to brighten in interactions.      [id number] OT Group (2 or more in attendance)      Patient Active Problem List   Diagnosis    Psychosis (H)

## 2025-07-09 NOTE — PLAN OF CARE
"  Rehab Group    Start time: 1015  End time: 1115  Patient time total: 35 minutes    attended partial group     #3 attended   Group Type: occupational therapy   Group Topic Covered: coping skills     Group Session Detail:  OT clinic     Patient Response/Contribution:  cooperative with task, organized, attentive, and actively engaged     Patient Detail:    Pt actively participated in occupational therapy clinic to facilitate coping skill exploration, creative expression within personally meaningful activities, and clinical observation of social, cognitive, and kinesthetic performance skills. Pt response: Independent to initiate, gather materials, sequence, and adjust to workspace demands as needed. Demonstrated good focus, planning, and attention to detail for selected creative expression task. Able to ask for assistance as needed, and occasionally socialized with peers and staff. Future-oriented in discussing upcoming discharge, and shared that she is most looking forward to \"taking a bubble bath\" when she gets home.      [id number] OT Group (2 or more in attendance)      Patient Active Problem List   Diagnosis    Psychosis (H)         "

## 2025-07-09 NOTE — PROGRESS NOTES
Rehab Group     Start time: 1715  End time: 1800  Patient time total: 20 minutes     attended partial group      #3 attended   Group Type: music   Group Topic Covered: cognitive activities, healthy leisure time, and relaxation       Group Session Detail: Relaxation      Patient Response/Contribution:  cooperative with task and listened actively      Patient Detail: Cooperatively engaged in Evening Music Relaxation group to decrease anxiety.  Somewhat restless and distractible affect, appropriately engaged in session, responding neutrally to the music.       Pt left group to check on her upcoming CAT scan status and did not return to session.       Activity Therapy Per 15 min ()    Patient Active Problem List   Diagnosis    Psychosis (H)

## 2025-07-09 NOTE — PLAN OF CARE
Patient visible in milieu this shift. Patient was seen in [facility] watching TV, attended group. Patient present with a flat affect, denied SI/SIB/AVH. Writer went over questionnaire at the beginning of shift with patient for MRI, IV placed around 1720. Writer called on several occasions to have patient go down but was told that the unit will be called once MRI is ready for patient, will call after shift change. Staff have been busy d/t unit acuity and codes when MRI called around 2100. Writer called MRI around 2205 to report that night shift will follow-up. IV is patent, flushed around 2245.    Patient endorsed feeling excited about getting discharged tomorrow. Family visited. Will continue to monitor and offer support.    Problem: Anxiety Signs/Symptoms  Goal: Optimized Energy Level (Anxiety Signs/Symptoms)  Outcome: Progressing    Problem: Psychotic Signs/Symptoms  Goal: Improved Behavioral Control (Psychotic Signs/Symptoms)  Outcome: Progressing     Problem: Depressive Signs/Symptoms  Goal: Optimized Energy Level (Depressive Signs/Symptoms)  Outcome: Progressing    Plan of Care Reviewed With: Patient

## 2025-07-09 NOTE — PLAN OF CARE
BEH IP Unit Acuity Rating Score (UARS)  Patient is given one point for every criteria they meet.    CRITERIA SCORING   On a 72 hour hold, court hold, committed, stay of commitment, or revocation. 0    Patient LOS on BEH unit exceeds 20 days. 0  LOS: 10   Patient under guardianship, 55+, otherwise medically complex, or under age 11. 1   Suicide ideation without relief of precipitating factors. 0   Current plan for suicide. 0   Current plan for homicide. 0   Imminent risk or actual attempt to seriously harm another without relief of factors precipitating the attempt. 1   Severe dysfunction in daily living (ex: complete neglect for self care, extreme disruption in vegetative function, extreme deterioration in social interactions). 1   Recent (last 7 days) or current physical aggression in the ED or on unit. 0   Restraints or seclusion episode in past 72 hours. 0   Recent (last 7 days) or current verbal aggression, agitation, yelling, etc., while in the ED or unit. 0   Active psychosis. 1   Need for constant or near constant redirection (from leaving, from others, etc).  1   Intrusive or disruptive behaviors. 1   Patient requires 3 or more hours of individualized nursing care per 8-hour shift (i.e. for ADLs, meds, therapeutic interventions). 0   TOTAL 5

## 2025-07-09 NOTE — DISCHARGE SUMMARY
----------------------------------------------------------------------------------------------------------  [facility]   Psychiatric Discharge Summary      Margoth Ludwig MRN# [id number]   Age: 55 year old YOB: 1970     Date of Admission:  6/29/2025  Date of Discharge:  7/9/2025  Admitting Physician:  Juan Velez MD  Discharge Physician:  Juan Velez MD    This document serves as a transfer of care to Margoth Ludwig's outpatient providers.     Events Leading to Hospitalization:      Margoth Ludwig is a 55 year old female with no past psychiatric history who presented voluntarily with increased anxiety and paranoid delusions/psychosis.     At presentation the patient's paranoia centered around people hacking into her devices and surveilling her, starting from a work device 2 weeks prior to admission. Collateral from  confirmed that work email was hacked though IT fixed it and the problem never extended beyond her work email. Her anxiety continued to escalate, especially worsening when she developed pneumonia (diagnosed 6/27). At this point she would wake up multiple times throughout the night with severe anxiety. She had a variety of paranoid delusions, including her phone being hacked, her  changing her medications and that her family was in some way targeting her. She also likely had auditory hallucinations, described as people placing devices around her house to make strange noises in order to scare/confuse her. At this point the patient presented voluntarily to the hospital.    See H&P by Juan Thayer MD on 6/29/2025 for additional details.      Diagnoses:   Primary Psychiatric Diagnosis  - Unspecified Schizophrenia Spectrum and Other Psychotic Disorder           - Brief psychotic disorder           - Delusional disorder     "       - MDD, severe, with psychosis  Secondary Psychiatric Diagnoses  None    Psychiatric Assessment:   Margoth Ludwig is a 55 year old female with no past psychiatric history who presented voluntarily with increased anxiety and paranoid delusions/psychosis.      There is no past psychiatric diagnoses or hospitalization. Significant symptoms on admission include paranoia and delusions of people hacking her work and personal devices, increased anxiety, and decreased sleep. The MSE on admission was pertinent for anxious affect. Throughout her stay she has had a variety of non-bizarre paranoid delusion, including but not limited to her  cheating on her, that people are trying to claim her  kidnapped her mother and that her  is going to die or leave her soon. These will change between conversations though often involve similar people or themes.     Collateral from  showed that work email was hacked at work 2-3 weeks ago and that IT fixed it. After that she became increasingly anxious and paranoid. Her  reported that she would wake up in the middle of the night and he could \"see it in her eyes\" that she was scared. It continued to get worse until it was severe enough she voluntarily went to the hospital. She gave a similar history on 7/7 when interviewed, saying that after she was hacked she \"spiraled\".     Biologically, patient does not appear to have any medical co-morbidities or history that's seem to contribution to current presentation. Thyroid labs, CBC and BMP are normal. Screen for alcohol or recreational drug use was negative. A brain MRI was normal, with no acute changes or concerning findings. Of note, patient has been post-menopausal for over a decade (menopause at 43 yo) so it is less likely there were significant recent hormonal changes. Psychologically, patient has good insight into her symptoms and is coherent in explaining her worries. She reports hobbies and coping " skills she utilizes. Socially, she has a stable job, living, and trusting . Protective factor includes engaged in treatment, open to receiving help, and strong family and theresa.         In summary, the patient's new onset of sleep disturbances, paranoia, auditory hallucinations and anxiety is most consistent with a first time psychotic episode. The psychosis started <1 month ago, so if no underlying physical cause is found it most closely aligns with either brief psychotic disorder or delusional disorder depending on whether the patient returns to her baseline. Also considering anxiety disorder with possible unspecific psychotic disorder.       Psychiatric Hospital Course:     Margoth Ludwig was admitted to [facility] as a voluntary patient         6/30: Patient took only PRN trazadone, melatonin, hydroxyzine and was stable. But she has delusional thoughts. Refused to take neuroleptic (aripiprazole)      7/1: Patient agree to start with ABILIFY 5 mg, and it is started today.      7/3: ABILIFY increased to 10 mg, PRN trazodone increased to 100 mg      7/7: PRN trazodone adjusted to  mg due to daytime sleepiness      7/8: Ordered further labs for first episode psychosis workup        The risks, benefits, alternatives, and side effects were discussed and understood by the patient and other caregivers.    Level of medication adherence: Good  Behaviors: The patient was safe and appropriate and did not require chemical/physical restraints during admission. She was cooperative with cares, had good group attendance, and was visible in the milieu.  Change in psychiatric symptoms: Over the course of this hospitalization the patient's symptoms of psychosis and anxiety continually improved.  Collateral information was obtained from her  and sisters.  Margoth was released to home. At the time of discharge she was determined to not be a danger to herself or others.     Risk Assessment:      Today Margoth SCHULTZ  "Vani denies SIB, SI, or HI. Patient has notable risk factors for self-harm, including anxiety and psychosis. However, risk is mitigated by commitment to family, absence of past attempts, ability to volunteer a safety plan, and history of seeking help when needed. Therefore, based on all available evidence including the factors cited above, she does not appear to be at imminent risk for self-harm, does not meet criteria for a 72-hr hold, and therefore remains appropriate for ongoing outpatient level of care. Additional steps taken to minimize risk include: medication optimization, close psychiatric follow up and provision of crisis resources. Voluntary referral for day treatment was offered, she accepted this offer.     Psychiatric Examination:     Oriented to:  Grossly Oriented  General:  Awake and Alert  Appearance:  appears stated age and Grooming is adequate  Behavior/Attitude:  Cooperative and Open  Eye Contact: Appropriate  Psychomotor: Normal No evidence of tics.  Speech:  appropriate volume/tone  Language: Fluent in English with appropriate syntax and vocabulary.  Mood:  \"okay\"  Affect:  stable  Thought Process:  coherent  Thought Content:   No SI/HI/AH/VH; delusions of paranoia  Associations:  intact  Insight:  good  Judgment:  good  Impulse control: fair  Attention Span:  grossly intact  Concentration:  grossly intact  Recent and Remote Memory:  not formally assessed  Fund of Knowledge: average  Muscle Strength and Tone: normal  Gait and Station: Normal     Medical Hospital Course:   Margoth Ludwig was medically cleared by the ED prior to admission to the unit. PTA Azithromycin was continued for the remaining 3 doses.     # recent atypical pneumonia diagnosis   - azithromycin 250 mg for 5 day course starting 6/27. Patient has been taking consistently but not on day of admission. Has some cough remaining but otherwise no SOB/Chest pain. Patient completed regimen of azithromycin.     Medical course: " Patient was physically examined by the ED prior to being transferred to the unit and was found to be medically stable and appropriate for admission.      Consults:  none    Labs were notable for the following:  - CBC unremarkable  - CMP unremarkable  - ESR wnl  - TSH normal  - UDS negative  - Vit D pending  - Hgb A1c at 5.7%  - Lipids : HDL chol - 40;   - Vit B12 and folate normal  - Urinalysis unremarkable  - EKG Qtc - 433 ; Sinus rhythm  - HIV Antigen/Antibody nonreactive  - RPR and titer nonreactive  - CXR: Atelectasis versus subtle infectious consolidation right cardiophrenic region, f/u CT prn.  - MRI Brain w/o and w/ contrast: negative       Discharge Medications:     Current Discharge Medication List        STOP taking these medications       azithromycin (ZITHROMAX) 250 MG tablet Comments:   Reason for Stopping:                Discharge Plan:   Medications as above  Psychiatric Appointments:   Psychiatry, Thursday August 7th, 2025 @ 10:00 AM Virtual  Provider:  Marija Castillo APRN CNP, Address: [facility]  Phone: [phone]. Visit link will be sent to [phone]. 3. Psychotherapy Appointments:   Psychiatry, Medication Management, [facility], Dr. Robledo [facility], [address] [place] [address], [address], [address], [place], [address], P: [phone], Intake Appointment: 8/28/25 at 9:10am.   Referrals: Psychiatry  Medical follow up: Patient will be contacted if results of CAMPBELL, Lyme test and ceruloplasmin are positive     Attestations:     Resident with med student: Teresa Mena MS3  [facility] Medical Student     I was present with the medical student who participated in the service and in the documentation of the note.  I have verified the history and personally performed the physical exam and medical decision making. I agree with the assessment and plan of care as documented in the note.    This patient was seen and discussed with my attending  physician.  Jovita Delgado  Psychiatry Resident Physician    The patient has been seen and evaluated by me, Juan Engel . I have examined the patient today and reviewed the discharge plan with the resident. I agree with the final assessment and plan, as noted in the discharge summary. I have reviewed today's vital signs, medications, labs and imaging.    Total time discharge plannin minutes      Juan Velez MD

## 2025-07-09 NOTE — PLAN OF CARE
Problem: Adult Behavioral Health Plan of Care  Goal: Adheres to Safety Considerations for Self and Others  Outcome: Progressing   Goal Outcome Evaluation:        Pt went to get an MRI around 0040 her pre-medication of ativan was administered.  Pt stated the MRI went well.  Although she c/o pain at the IV site and asked that the IV be removed.  When IV was removed this writer noticed some bruising and redness at the site a dressing was applied.    Pt was paranoid of this writer and refused to take her pre-MRI medication.  When core staff she knew she took the medication.        Pt a wake x1 briefly otherwise appeared to be a sleep at all other safety checks.  Pt slept for 5 hours.

## 2025-07-09 NOTE — TELEPHONE ENCOUNTER
----- Message from Lu Jean sent at 7/9/2025  8:52 AM CDT -----  Regarding: Med Management appointment  Transition Clinic Referral   [place]      THERAPY: No    MEDICATION : Yes   Referrals for Medication are ONLY accepted from the following areas (select): Inpatient Mental Health Unit - HIGH PRIORITY  Suboxone and Opioid Management Referrals are automatically denied.  TC Psychiatry cannot see patient without active medical insurance.  Next level of psychiatry care must be within 12 months.  TC Psychiatry cannot accept patient with next level of care scheduled with PCP.  The transition clinic cannot follow patients who are on a restricted recipient program.    Long-Acting injection(PARRA)? No    Referring Provider Contact Name:   Juan Velez MD    Attending   Since 6/29/2025   [phone]        Reason for Transition Clinic Referral: Needs bridging med man appt - First week of August      Next Med Man Appt   Psychiatry/Medication Management:  [facility], Dr. Robledo   [facility], [address] [place] [address]. [address], [address], [address], [place], [address]  P: [phone]  Intake Appointment: 8/28/25 at 9:10am       Requesting to schedule in collaboration with patient (via Teams): Yes Where is the patient located?     [address] [facility]         Patient location preference: In person or Virtual ok    Lu Jean    (Master Form: Updated 11/28/2023)

## 2025-07-09 NOTE — TELEPHONE ENCOUNTER
Writer called [facility] and spoke with pt and scheduled TC psychiatry appointment on 07/15/2025 @ 12:30 pm.    Estela Barrios  07/09/2025  927

## 2025-07-09 NOTE — PLAN OF CARE
Problem: Adult Behavioral Health Plan of Care  Goal: Plan of Care Review  Outcome: Progressing  Flowsheets (Taken 7/9/2025 1252)  Plan of Care Reviewed With: patient  Overall Patient Progress: improving  Patient Agreement with Plan of Care: agrees  Goal: Optimized Coping Skills in Response to Life Stressors  Outcome: Progressing  Flowsheets (Taken 7/9/2025 1252)  Optimized Coping Skills in Response to Life Stressors: making progress toward outcome     Problem: Anxiety Signs/Symptoms  Goal: Optimized Cognitive Function (Anxiety Signs/Symptoms)  Flowsheets (Taken 7/9/2025 1252)  Mutually Determined Action Steps (Optimized Cognitive Function): participates in one-to-one sessions  Goal: Improved Mood Symptoms (Anxiety Signs/Symptoms)  Outcome: Progressing  Flowsheets (Taken 7/9/2025 1252)  Mutually Determined Action Steps (Improved Mood Symptoms):   adheres to medication regimen   shares insight re: need for meds     Problem: Psychotic Signs/Symptoms  Goal: Decreased Sensory Symptoms (Psychotic Signs/Symptoms)  Outcome: Progressing   Goal Outcome Evaluation:    Plan of Care Reviewed With: patient Plan of Care Reviewed With: patient    Overall Patient Progress: improvingOverall Patient Progress: improving     Margoth active on unit-attending grps-reports thinking clearer this afternoon-appears more relaxed-denies paranoia or unusual thinking- Reviewed AVS including medication schedule and outpt tx plans and verbalized understanding and agreement- discharged 1543 care of - has 30 day supply of medications in her possession

## 2025-07-10 LAB — CERULOPLASMIN SERPL-MCNC: 20 MG/DL (ref 20–60)

## 2025-07-11 LAB — B BURGDOR DNA SPEC QL NAA+PROBE: NOT DETECTED

## 2025-07-13 LAB — ANA SER QL IF: NEGATIVE

## 2025-08-05 ENCOUNTER — TRANSFERRED RECORDS (OUTPATIENT)
Dept: HEALTH INFORMATION MANAGEMENT | Facility: CLINIC | Age: 55
End: 2025-08-05
Payer: COMMERCIAL

## 2025-08-06 ASSESSMENT — PATIENT HEALTH QUESTIONNAIRE - PHQ9
10. IF YOU CHECKED OFF ANY PROBLEMS, HOW DIFFICULT HAVE THESE PROBLEMS MADE IT FOR YOU TO DO YOUR WORK, TAKE CARE OF THINGS AT HOME, OR GET ALONG WITH OTHER PEOPLE: NOT DIFFICULT AT ALL
SUM OF ALL RESPONSES TO PHQ QUESTIONS 1-9: 1
SUM OF ALL RESPONSES TO PHQ QUESTIONS 1-9: 1

## 2025-08-07 ENCOUNTER — VIRTUAL VISIT (OUTPATIENT)
Dept: BEHAVIORAL HEALTH | Facility: CLINIC | Age: 55
End: 2025-08-07
Payer: COMMERCIAL

## 2025-08-07 VITALS — BODY MASS INDEX: 22.88 KG/M2 | HEIGHT: 64 IN

## 2025-08-07 DIAGNOSIS — F29 PSYCHOSIS, UNSPECIFIED PSYCHOSIS TYPE (H): ICD-10-CM

## 2025-08-07 DIAGNOSIS — G47.09 OTHER INSOMNIA: ICD-10-CM

## 2025-08-07 RX ORDER — TRAZODONE HYDROCHLORIDE 50 MG/1
25-50 TABLET ORAL
Qty: 30 TABLET | Refills: 1 | Status: SHIPPED | OUTPATIENT
Start: 2025-08-07

## 2025-08-07 RX ORDER — ARIPIPRAZOLE 10 MG/1
10 TABLET ORAL DAILY
Qty: 90 TABLET | Refills: 0 | Status: SHIPPED | OUTPATIENT
Start: 2025-08-07

## 2025-08-07 ASSESSMENT — PAIN SCALES - GENERAL: PAINLEVEL_OUTOF10: NO PAIN (0)

## 2025-09-03 ENCOUNTER — TELEPHONE (OUTPATIENT)
Dept: PSYCHIATRY | Facility: CLINIC | Age: 55
End: 2025-09-03
Payer: COMMERCIAL